# Patient Record
Sex: MALE | Race: WHITE | NOT HISPANIC OR LATINO | Employment: PART TIME | URBAN - METROPOLITAN AREA
[De-identification: names, ages, dates, MRNs, and addresses within clinical notes are randomized per-mention and may not be internally consistent; named-entity substitution may affect disease eponyms.]

---

## 2017-02-02 ENCOUNTER — TELEPHONE (OUTPATIENT)
Dept: FAMILY MEDICINE | Facility: CLINIC | Age: 62
End: 2017-02-02

## 2017-02-02 NOTE — Clinical Note
[facility]   [address]  [facility] [id number]      February 2, 2017      Sergio Davis  [address]  [place] [address]        Dearr Sergio,      This is a friendly reminder that you are due for a blood pressure recheck.  Your health is important to us.  We periodically review your chart to make sure that you are up-to-date on all health maintenance and that your chronic illnesses are adequately controlled.  It has come to our attention that your last blood pressure check was above the goal that your primary care provider has set for you.      Please call our office or request an appointment through [facility] at your earliest convenience.  You have multiple appointment options available to you, including no cost, low cost and convenient hours.  If you are interested in one of these options, call to speak with one of our nurses or medical assistants, to see what would be the most appropriate for you.      If you have any questions or concerns, please reach out to us through [facility] or by calling our clinic at [phone].          Sincerely,    [facility] [employer]     [facility]  Toñito Simpson MD

## 2017-02-02 NOTE — TELEPHONE ENCOUNTER
Panel Management Review      Patient has the following on his problem list:     Hypertension   Last three blood pressure readings:  BP Readings from Last 3 Encounters:   10/20/16 160/83   03/11/15 121/70   08/19/13 140/77     Blood pressure: Failed    HTN Guidelines:  Age 18-59 BP range:  Less than 140/90  Age 60-85 with Diabetes:  Less than 140/90  Age 60-85 without Diabetes:  less than 150/90      Composite cancer screening  Chart review shows that this patient is due/due soon for the following None  Summary:    Patient is due/failing the following:   BP CHECK    Action needed:   Patient needs nurse only appointment.    Type of outreach:    Sent letter.    Questions for provider review:    None                                                                                                                                    Jenny Gilliam CMA

## 2017-02-09 ENCOUNTER — TELEPHONE (OUTPATIENT)
Dept: FAMILY MEDICINE | Facility: CLINIC | Age: 62
End: 2017-02-09

## 2017-02-09 NOTE — Clinical Note
[address] [address]  [phone]  Dept: [phone]      February 9, 2017      Sergio Davis  [address]  [facility] [address]    Dear Sergio Davis,     At [facility] we care about your health and are committed to providing quality patient care.    Which includes staying current on preventive cancer screenings.  You can increase your chances of finding and treating cancers through regular screenings.      Our records indicate you may be due for the following preventive screening(s):    Colonoscopy    Colonoscopy is recommended every ten years for everyone age 50 and older. We strongly urge our patient's to consider having a colonoscopy done, which is the best screening test available and only needs to be done every 10 years if normal. If you are unwilling or unable to have a colonoscopy then we recommend the annual stool testing for blood. This test is called a FIT test and it looks for blood in the stool.     To schedule an appointment for your colonoscopy, please see the attached referral.     To schedule an appointment or discuss this screening further, you may contact us by phone at the [facility] at [phone] or online through the patient [email or web address] @ [email or web address]    If you have had any of the screenings listed above at another facility, please call us so that we may update your chart.      Your partners in health,      Quality Committee at [facility]

## 2017-11-24 DIAGNOSIS — I10 ESSENTIAL HYPERTENSION WITH GOAL BLOOD PRESSURE LESS THAN 130/80: ICD-10-CM

## 2017-11-24 RX ORDER — AMLODIPINE BESYLATE 5 MG/1
TABLET ORAL
Qty: 30 TABLET | Refills: 0 | Status: SHIPPED | OUTPATIENT
Start: 2017-11-24 | End: 2017-12-28

## 2017-11-24 NOTE — TELEPHONE ENCOUNTER
Routing refill request to provider for review/approval because:  Sammi given x1 and patient did not follow up, please advise  Chuyita Evans RN

## 2017-11-24 NOTE — TELEPHONE ENCOUNTER
Sammi refill #2. Patient did not have any future appointments set up.  If there is no clinic appointment within 30 days, there future Rx refill requests will be needed, regardless of circumstances.

## 2017-12-21 DIAGNOSIS — I10 ESSENTIAL HYPERTENSION WITH GOAL BLOOD PRESSURE LESS THAN 130/80: ICD-10-CM

## 2017-12-21 NOTE — TELEPHONE ENCOUNTER
Requested Prescriptions   Pending Prescriptions Disp Refills     amLODIPine (NORVASC) 5 MG tablet [Pharmacy Med Name: AMLODIPINE BESYLATE 5 MG TAB]  Last Written Prescription Date:  11/24/17  Last Fill Quantity: 30,  # refills: 0   Last Office Visit with [facility], P or [facility] prescribing provider:  10/20/16   Future Office Visit:    30 tablet 0     Sig: TAKE 1 TABLET BY MOUTH DAILY WITH DINNER. MUST HAVE APPT SCHEDULED FOR MORE REFILLS    Calcium Channel Blockers Protocol  Failed    12/21/2017  9:05 AM       Failed - Blood pressure under 140/90    BP Readings from Last 3 Encounters:   10/20/16 160/83   03/11/15 121/70   08/19/13 140/77                Failed - Recent or future visit with authorizing provider    Patient had office visit in the last year or has a visit in the next 30 days with authorizing provider.  See chart review.              Failed - Normal serum creatinine on file in past 12 months    Recent Labs   Lab Test  10/20/16   1029   CR  1.02            Passed - Patient is age 18 or older

## 2017-12-26 RX ORDER — AMLODIPINE BESYLATE 5 MG/1
TABLET ORAL
Qty: 30 TABLET | Refills: 0 | OUTPATIENT
Start: 2017-12-26

## 2017-12-26 NOTE — TELEPHONE ENCOUNTER
..Reason for Call:  Other     Detailed comments: Bekah called from [facility] in regards to pending script    Phone Number Patient can be reached at: [phone]    Best Time: anytime    Can we leave a detailed message on this number? Not Applicable    Call taken on 12/26/2017 at 2:30 PM by Aubrey Estrada

## 2017-12-27 NOTE — TELEPHONE ENCOUNTER
This writer attempted to contact patient on 12/27/17      Reason for call patient needs to schedule an appointment before medication is refilled and left message to return call.      If patient calls back:   Schedule Office Visit appointment as soon as possible with Dr. Simpson for a Blood pressure check and medication refills.        William Up

## 2017-12-27 NOTE — TELEPHONE ENCOUNTER
Call pt- Needs appointment for further refills.  Jeanette Worrell RN    amLODIPine (NORVASC) 5 MG tablet 30 tablet 0 11/24/2017  No   Sig: TAKE 1 TABLET BY MOUTH DAILY WITH DINNER   Class: E-Prescribe   Notes to Pharmacy: Last refill since no scheduled clinic appointments.   Order: [id number]   E-Prescribing Status: Receipt confirmed by pharmacy (11/24/2017  3:43 PM CST)

## 2017-12-28 RX ORDER — AMLODIPINE BESYLATE 5 MG/1
TABLET ORAL
Qty: 30 TABLET | Refills: 0 | Status: SHIPPED | OUTPATIENT
Start: 2017-12-28 | End: 2018-01-04

## 2017-12-28 NOTE — TELEPHONE ENCOUNTER
Patient is scheduled with Ondina Carrillo NP on 1//4/18, provider please address refill for patient.  William Up,  For [facility] [employer] [facility]

## 2018-01-04 ENCOUNTER — OFFICE VISIT (OUTPATIENT)
Dept: FAMILY MEDICINE | Facility: CLINIC | Age: 63
End: 2018-01-04
Payer: COMMERCIAL

## 2018-01-04 VITALS
DIASTOLIC BLOOD PRESSURE: 76 MMHG | WEIGHT: 278.4 LBS | HEIGHT: 77 IN | BODY MASS INDEX: 32.87 KG/M2 | HEART RATE: 67 BPM | TEMPERATURE: 97.3 F | SYSTOLIC BLOOD PRESSURE: 128 MMHG | OXYGEN SATURATION: 100 %

## 2018-01-04 DIAGNOSIS — Z00.00 ROUTINE HISTORY AND PHYSICAL EXAMINATION OF ADULT: Primary | ICD-10-CM

## 2018-01-04 DIAGNOSIS — Z12.11 SCREEN FOR COLON CANCER: ICD-10-CM

## 2018-01-04 DIAGNOSIS — Z13.6 CARDIOVASCULAR SCREENING; LDL GOAL LESS THAN 130: ICD-10-CM

## 2018-01-04 DIAGNOSIS — E66.09 CLASS 1 OBESITY DUE TO EXCESS CALORIES WITH SERIOUS COMORBIDITY AND BODY MASS INDEX (BMI) OF 32.0 TO 32.9 IN ADULT: ICD-10-CM

## 2018-01-04 DIAGNOSIS — E66.811 CLASS 1 OBESITY DUE TO EXCESS CALORIES WITH SERIOUS COMORBIDITY AND BODY MASS INDEX (BMI) OF 32.0 TO 32.9 IN ADULT: ICD-10-CM

## 2018-01-04 DIAGNOSIS — I10 ESSENTIAL HYPERTENSION WITH GOAL BLOOD PRESSURE LESS THAN 130/80: ICD-10-CM

## 2018-01-04 LAB
CREAT UR-MCNC: 196 MG/DL
MICROALBUMIN UR-MCNC: 9 MG/L
MICROALBUMIN/CREAT UR: 4.64 MG/G CR (ref 0–17)

## 2018-01-04 PROCEDURE — 99396 PREV VISIT EST AGE 40-64: CPT | Performed by: NURSE PRACTITIONER

## 2018-01-04 PROCEDURE — 82043 UR ALBUMIN QUANTITATIVE: CPT | Performed by: NURSE PRACTITIONER

## 2018-01-04 RX ORDER — AMLODIPINE BESYLATE 5 MG/1
TABLET ORAL
Qty: 90 TABLET | Refills: 3 | Status: SHIPPED | OUTPATIENT
Start: 2018-01-04 | End: 2019-02-26

## 2018-01-04 ASSESSMENT — PAIN SCALES - GENERAL: PAINLEVEL: NO PAIN (0)

## 2018-01-04 NOTE — PATIENT INSTRUCTIONS
At [facility], we strive to deliver an exceptional experience to you, every time we see you.  If you receive a survey in the mail, please send us back your thoughts. We really do value your feedback.    Based on your medical history, these are the current health maintenance/preventive care services that you are due for (some may have been done at this visit.)  Health Maintenance Due   Topic Date Due     COLONOSCOPY Q10 YR  11/19/1966     TETANUS IMMUNIZATION (SYSTEM ASSIGNED)  11/19/1972     HEPATITIS C SCREENING  11/19/1972     FIT Q1 YR  03/25/2014     INFLUENZA VACCINE (SYSTEM ASSIGNED)  09/01/2017         Suggested websites for health information:  Www.[place].org : Up to date and easily searchable information on multiple topics.  Www.medlineplus.gov : medication info, interactive tutorials, watch real surgeries online  Www.familydoctor.org : good info from the Academy of Family Physicians  Www.cdc.gov : public health info, travel advisories, epidemics (H1N1)  Www.aap.org : children's health info, normal development, vaccinations  Www.health.[facility].[place].us : [place] dept of health, public health issues in [place], N1N1    Your care team:                            Family Medicine Internal Medicine   MD Toñito Bangura MD Shantel Branch-Fleming, MD Katya Georgiev PA-C Nam Ho, MD Pediatrics   SORIN Merrill, ZENAIDA Dominguez APRN CNP   MD Liane Huffman MD Deborah Mielke, MD Kim Thein, APRN [employer]      Clinic hours: Monday - Thursday 7 am-7 pm; Fridays 7 am-5 pm.   Urgent care: Monday - Friday 11 am-9 pm; Saturday and Sunday 9 am-5 pm.  Pharmacy : Monday -Thursday 8 am-8 pm; Friday 8 am-6 pm; Saturday and Sunday 9 am-5 pm.     Clinic: [phone]   Pharmacy: [phone]    Preventive Health Recommendations  Male Ages 50   64    Yearly exam:             See your health care provider every year in order to  o   Review health changes.   o    Discuss preventive care.    o   Review your medicines if your doctor has prescribed any.     Have a cholesterol test every 5 years, or more frequently if you are at risk for high cholesterol/heart disease.     Have a diabetes test (fasting glucose) every three years. If you are at risk for diabetes, you should have this test more often.     Have a colonoscopy at age 50, or have a yearly FIT test (stool test). These exams will check for colon cancer.      Talk with your health care provider about whether or not a prostate cancer screening test (PSA) is right for you.    You should be tested each year for STDs (sexually transmitted diseases), if you re at risk.     Shots: Get a flu shot each year. Get a tetanus shot every 10 years.     Nutrition:    Eat at least 5 servings of fruits and vegetables daily.     Eat whole-grain bread, whole-wheat pasta and brown rice instead of white grains and rice.     Talk to your provider about Calcium and Vitamin D.     Lifestyle    Exercise for at least 150 minutes a week (30 minutes a day, 5 days a week). This will help you control your weight and prevent disease.     Limit alcohol to one drink per day.     No smoking.     Wear sunscreen to prevent skin cancer.     See your dentist every six months for an exam and cleaning.     See your eye doctor every 1 to 2 years.    Established High Blood Pressure    High blood pressure (hypertension) is a chronic disease. Often, healthcare providers don t know what causes it. But it can be caused by certain health conditions and medicines.  If you have high blood pressure, you may not have any symptoms. If you do have symptoms, they may include headache, dizziness, changes in your vision, chest pain, and shortness of breath. But even without symptoms, high blood pressure that s not treated raises your risk for heart attack and stroke. High blood pressure is a serious health risk and shouldn t be ignored.  A blood pressure reading is made up  of two numbers: a higher number over a lower number. The top number is the systolic pressure. The bottom number is the diastolic pressure. A normal blood pressure is a systolic pressure of  less than 120 over a diastolic pressure of less than 80. You will see your blood pressure readings written together. For example, a person with a systolic pressure of 188 and a diastolic pressure of 78 will have 118/78 written in the medical record.  High blood pressure is when either the top number is 140 or higher, or the bottom number is 90 or higher. This must be the result when taking your blood pressure a number of times. The blood pressures between normal and high are called prehypertension.  Home care  If you have high blood pressure, you should do what is listed below to lower your blood pressure. If you are taking medicines for high blood pressure, these methods may reduce or end your need for medicines in the future.    Begin a weight-loss program if you are overweight.    Cut back on how much salt you get in your diet. Here s how to do this:    Don t eat foods that have a lot of salt. These include olives, pickles, smoked meats, and salted potato chips.    Don t add salt to your food at the table.    Use only small amounts of salt when cooking.    Start an exercise program. Talk with your healthcare provider about the type of exercise program that would be best for you. It doesn't have to be hard. Even brisk walking for 20 minutes 3 times a week is a good form of exercise.    Don t take medicines that stimulate the heart. This includes many over-the-counter cold and sinus decongestant pills and sprays, as well as diet pills. Check the warnings about hypertension on the label. Before buying any over-the-counter medicines or supplements, always ask the pharmacist about the product's potential interaction with your high blood pressure and your high blood pressure medicines.    Stimulants such as amphetamine or cocaine  could be deadly for someone with high blood pressure. Never take these.    Limit how much caffeine you get in your diet. Switch to caffeine-free products.    Stop smoking. If you are a long-time smoker, this can be hard. Talk to your healthcare provider about medicines and nicotine replacement options to help you. Also, enroll in a stop-smoking program to make it more likely that you will quit for good.    Learn how to handle stress. This is an important part of any program to lower blood pressure. Learn about relaxation methods like meditation, yoga, or biofeedback.    If your provider prescribed medicines, take them exactly as directed. Missing doses may cause your blood pressure get out of control.    If you miss a dose or doses, check with your healthcare provider or pharmacist about what to do.    Consider buying an automatic blood pressure machine. Ask your provider for a recommendation. You can get one of these at most pharmacies.     The American Heart Association recommends the following guidelines for home blood pressure monitoring:    Don't smoke or drink coffee for 30 minutes before taking your blood pressure.    Go to the bathroom before the test.    Relax for 5 minutes before taking the measurement.    Sit with your back supported (don't sit on a couch or soft chair); keep your feet on the floor uncrossed. Place your arm on a solid flat surface (like a table) with the upper part of the arm at heart level. Place the middle of the cuff directly above the eye of the elbow. Check the monitor's instruction manual for an illustration.    Take multiple readings. When you measure, take 2 to 3 readings one minute apart and record all of the results.    Take your blood pressure at the same time every day, or as your healthcare provider recommends.    Record the date, time, and blood pressure reading.    Take the record with you to your next medical appointment. If your blood pressure monitor has a built-in  memory, simply take the monitor with you to your next appointment.    Call your provider if you have several high readings. Don't be frightened by a single high blood pressure reading, but if you get several high readings, check in with your healthcare provider.    Note: When blood pressure reaches a systolic (top number) of 180 or higher OR diastolic (bottom number) of 110 or higher, seek emergency medical treatment.  Follow-up care  You will need to see your healthcare provider regularly. This is to check your blood pressure and to make changes to your medicines. Make a follow-up appointment as directed. Bring the record of your home blood pressure readings to the appointment.  When to seek medical advice  Call your healthcare provider right away if any of these occur:    Blood pressure reaches a systolic (upper number) of 180 or higher OR a diastolic (bottom number) of 110 or higher    Chest pain or shortness of breath    Severe headache    Throbbing or rushing sound in the ears    Nosebleed    Sudden severe pain in your belly (abdomen)    Extreme drowsiness, confusion, or fainting    Dizziness or spinning sensation (vertigo)    Weakness of an arm or leg or one side of the face    You have problems speaking or seeing   Date Last Reviewed: 12/1/2016 2000-2017 [employer]. [address] [place] [address], [place] [address]. All rights reserved. This information is not intended as a substitute for professional medical care. Always follow your healthcare professional's instructions.

## 2018-01-04 NOTE — PROGRESS NOTES
"  SUBJECTIVE:   CC: Sergio Davis is an 62 year old male who presents for preventative health visit.     Healthy Habits:    Do you get at least three servings of calcium containing foods daily (dairy, green leafy vegetables, etc.)? Not sure    Amount of exercise or daily activities, outside of work: NO    Problems taking medications regularly No    Medication side effects: No    Have you had an eye exam in the past two years? yes    Do you see a dentist twice per year? yes    Do you have sleep apnea, excessive snoring or daytime drowsiness?yes         Hyperlipidemia Follow-Up      Rate your low fat/cholesterol diet?: good    Taking statin?  No    Other lipid medications/supplements?:  none    Hypertension Follow-up      Outpatient blood pressures are being checked at home.  Results are 130-140's/76-80.    Low Salt Diet: no added salt            Today's PHQ-2 Score:   PHQ-2 ( 1999 Pfizer) 1/4/2018 10/20/2016   Q1: Little interest or pleasure in doing things 0 0   Q2: Feeling down, depressed or hopeless 0 0   PHQ-2 Score 0 0         Abuse: Current or Past(Physical, Sexual or Emotional)- No  Do you feel safe in your environment - Yes  Social History   Substance Use Topics     Smoking status: Never Smoker     Smokeless tobacco: Never Used      Comment: no second hand smoke     Alcohol use Yes      Comment: \"very little\"      If you drink alcohol do you typically have >3 drinks per day or >7 drinks per week? No                      Last PSA: No results found for: PSA    Reviewed orders with patient. Reviewed health maintenance and updated orders accordingly - Yes  Labs reviewed in EPIC  BP Readings from Last 3 Encounters:   01/04/18 128/76   10/20/16 160/83   03/11/15 121/70    Wt Readings from Last 3 Encounters:   01/04/18 278 lb 6.4 oz (126.3 kg)   10/20/16 282 lb (127.9 kg)   08/19/13 253 lb 3.2 oz (114.9 kg)                  Patient Active Problem List   Diagnosis     S/P complete repair of rotator cuff     " "Cardiomegaly     CARDIOVASCULAR SCREENING; LDL GOAL LESS THAN 130     Essential hypertension with goal blood pressure less than 130/80     Obesity     Cataract of left eye     Past Surgical History:   Procedure Laterality Date     ARTHROSCOPY SHOULDER ROTATOR CUFF REPAIR      10/25/10       Social History   Substance Use Topics     Smoking status: Never Smoker     Smokeless tobacco: Never Used      Comment: no second hand smoke     Alcohol use Yes      Comment: \"very little\"     Family History   Problem Relation Age of Onset     CANCER Sister      Hodgjostin's Lymphoma     DIABETES No family hx of      Hypertension No family hx of      CEREBROVASCULAR DISEASE No family hx of      C.A.D. No family hx of      Myocardial Infarction No family hx of      Heart Failure No family hx of          Current Outpatient Prescriptions   Medication Sig Dispense Refill     amLODIPine (NORVASC) 5 MG tablet TAKE 1 TABLET BY MOUTH DAILY WITH DINNER 30 tablet 0     ORDER FOR DME Respironics REMSTAR 60 Series Auto CPAP 6cm H2O, Mirage Fx wide nasal mask w/chinstrap             Reviewed and updated as needed this visit by clinical staffTobacco  Allergies  Meds  Med Hx  Surg Hx  Fam Hx  Soc Hx        Reviewed and updated as needed this visit by Provider        Past Medical History:   Diagnosis Date     Hyperlipidemia LDL goal < 160      NO ACTIVE PROBLEMS      Rotator cuff tear       Past Surgical History:   Procedure Laterality Date     ARTHROSCOPY SHOULDER ROTATOR CUFF REPAIR      10/25/10       ROS:  C: NEGATIVE for fever, chills, change in weight  I: NEGATIVE for worrisome rashes, moles or lesions  E: NEGATIVE for vision changes or irritation  ENT: NEGATIVE for ear, mouth and throat problems  R: NEGATIVE for significant cough or SOB  B: NEGATIVE for masses, tenderness or discharge  CV: NEGATIVE for chest pain, palpitations or peripheral edema  GI: NEGATIVE for nausea, abdominal pain, heartburn, or change in bowel habits   male: " "negative for dysuria, hematuria, decreased urinary stream, erectile dysfunction, urethral discharge  M: NEGATIVE for significant arthralgias or myalgia  N: NEGATIVE for weakness, dizziness or paresthesias  E: NEGATIVE for temperature intolerance, skin/hair changes  H: NEGATIVE for bleeding problems  P: NEGATIVE for changes in mood or affect    OBJECTIVE:   /76 (BP Location: Left arm, Patient Position: Chair, Cuff Size: Adult Regular)  Pulse 67  Temp 97.3  F (36.3  C) (Oral)  Ht 6' 5.17\" (1.96 m)  Wt 278 lb 6.4 oz (126.3 kg)  SpO2 100%  BMI 32.87 kg/m2  EXAM:  GENERAL: healthy, alert and no distress  EYES: Eyes grossly normal to inspection, PERRL and conjunctivae and sclerae normal  HENT: ear canals and TM's normal, nose and mouth without ulcers or lesions  NECK: no adenopathy, no asymmetry, masses, or scars and thyroid normal to palpation  RESP: lungs clear to auscultation - no rales, rhonchi or wheezes  CV: regular rate and rhythm, normal S1 S2, no S3 or S4, no murmur, click or rub, no peripheral edema and peripheral pulses strong  ABDOMEN: soft, nontender, no hepatosplenomegaly, no masses and bowel sounds normal   (male): normal male genitalia without lesions or urethral discharge, no hernia  RECTAL: normal sphincter tone, no rectal masses, prostate normal size, smooth, nontender without nodules or masses  MS: no gross musculoskeletal defects noted, no edema  SKIN: no suspicious lesions or rashes  NEURO: Normal strength and tone, mentation intact and speech normal  PSYCH: mentation appears normal, affect normal/bright  LYMPH: no cervical, supraclavicular, axillary, or inguinal adenopathy    ASSESSMENT/PLAN:   1. Routine history and physical examination of adult      2. Essential hypertension with goal blood pressure less than 130/80  Checking labs, refilled Norvasc, encouraged continued heart healthy diet, regular exercise, weight loss.  - Albumin Random Urine Quantitative with Creat Ratio  - " "Comprehensive metabolic panel (BMP + Alb, Alk Phos, ALT, AST, Total. Bili, TP); Future  - amLODIPine (NORVASC) 5 MG tablet; TAKE 1 TABLET BY MOUTH DAILY WITH DINNER  Dispense: 90 tablet; Refill: 3    3. CARDIOVASCULAR SCREENING; LDL GOAL LESS THAN 130  Last LDL was 140/goal is < 130.  Rechecking Lipids when he is fasting.  Advised low chol deti, weight loss, regular exercise to improve his CV fitness/decrease CV risks.  - Lipid panel reflex to direct LDL Fasting; Future    4. Screen for colon cancer    - Fecal colorectal cancer screen (FIT); Future  5.  Class 1 obesity due to excess calories with serious comorbidity and body mass index (BMI) of 32.0-32.9.  Benefits of weight loss reviewed in detail, encouraged him to cut back on the carbohydrates in the diet, consume more fruits and vegetables, drink plenty of water, avoid fruit juices, sodas, get 150 min moderate exercise/week.  Recheck weight in 6 months.      Patient reports that he is current on both tetanus vaccine and Influenza vaccine (done outside our system), tetanus done within the last 4 years per patient, Influenza done 10/2017.    COUNSELING:  Reviewed preventive health counseling, as reflected in patient instructions       Regular exercise       Healthy diet/nutrition       Vision screening       Safe sex practices/STD prevention       Consider Hep C screening for patients born between 1945 and 1965       Colon cancer screening       Prostate cancer screening  Patient declined Hep C screen as well as PSA screen.     reports that he has never smoked. He has never used smokeless tobacco.      Estimated body mass index is 32.87 kg/(m^2) as calculated from the following:    Height as of this encounter: 6' 5.17\" (1.96 m).    Weight as of this encounter: 278 lb 6.4 oz (126.3 kg).   Weight management plan: Discussed healthy diet and exercise guidelines and patient will follow up in 12 months in clinic to re-evaluate.    Counseling Resources:  ATP IV " Guidelines  Pooled Cohorts Equation Calculator  FRAX Risk Assessment  ICSI Preventive Guidelines  Dietary Guidelines for Americans, 2010  USDA's MyPlate  ASA Prophylaxis  Lung CA Screening    RADHA Meza CNP  [facility]

## 2018-01-04 NOTE — MR AVS SNAPSHOT
After Visit Summary   1/4/2018    Sergio Davis    MRN: [id number]           Patient Information     Date Of Birth          1955        Visit Information        Provider Department      1/4/2018 8:00 AM Muna Carrillo APRN CNP [facility]        Today's Diagnoses     Routine history and physical examination of adult    -  1    Essential hypertension with goal blood pressure less than 130/80        CARDIOVASCULAR SCREENING; LDL GOAL LESS THAN 130        Screen for colon cancer        Need for prophylactic vaccination and inoculation against influenza        Need for prophylactic vaccination with tetanus-diphtheria (TD)          Care Instructions    At [facility], we strive to deliver an exceptional experience to you, every time we see you.  If you receive a survey in the mail, please send us back your thoughts. We really do value your feedback.    Based on your medical history, these are the current health maintenance/preventive care services that you are due for (some may have been done at this visit.)  Health Maintenance Due   Topic Date Due     COLONOSCOPY Q10 YR  11/19/1966     TETANUS IMMUNIZATION (SYSTEM ASSIGNED)  11/19/1972     HEPATITIS C SCREENING  11/19/1972     FIT Q1 YR  03/25/2014     INFLUENZA VACCINE (SYSTEM ASSIGNED)  09/01/2017         Suggested websites for health information:  Www.canvs.co.org : Up to date and easily searchable information on multiple topics.  Www.medlineplus.gov : medication info, interactive tutorials, watch real surgeries online  Www.familydoctor.org : good info from the Academy of Family Physicians  Www.cdc.gov : public health info, travel advisories, epidemics (H1N1)  Www.aap.org : children's health info, normal development, vaccinations  Www.health.On license of [facility].[place].us : [place] dept of health, public health issues in [place], N1N1    Your care team:                            Family Medicine Internal Medicine   Brando  MD Toñito Pelletier MD Shantel Branch-Fleming, MD Katya Georgiev PA-C Nam Ho, MD Pediatrics   SORIN Merrill, MD Liane Aldana CNP, MD Deborah Mielke, MD Kim Thein, APRN [facility]      Clinic hours: Monday - Thursday 7 am-7 pm; Fridays 7 am-5 pm.   Urgent care: Monday - Friday 11 am-9 pm; Saturday and Sunday 9 am-5 pm.  Pharmacy : Monday -Thursday 8 am-8 pm; Friday 8 am-6 pm; Saturday and Sunday 9 am-5 pm.     Clinic: [phone]   Pharmacy: [phone]    Preventive Health Recommendations  Male Ages 50 - 64    Yearly exam:             See your health care provider every year in order to  o   Review health changes.   o   Discuss preventive care.    o   Review your medicines if your doctor has prescribed any.     Have a cholesterol test every 5 years, or more frequently if you are at risk for high cholesterol/heart disease.     Have a diabetes test (fasting glucose) every three years. If you are at risk for diabetes, you should have this test more often.     Have a colonoscopy at age 50, or have a yearly FIT test (stool test). These exams will check for colon cancer.      Talk with your health care provider about whether or not a prostate cancer screening test (PSA) is right for you.    You should be tested each year for STDs (sexually transmitted diseases), if you re at risk.     Shots: Get a flu shot each year. Get a tetanus shot every 10 years.     Nutrition:    Eat at least 5 servings of fruits and vegetables daily.     Eat whole-grain bread, whole-wheat pasta and brown rice instead of white grains and rice.     Talk to your provider about Calcium and Vitamin D.     Lifestyle    Exercise for at least 150 minutes a week (30 minutes a day, 5 days a week). This will help you control your weight and prevent disease.     Limit alcohol to one drink per day.     No smoking.     Wear sunscreen to prevent skin cancer.     See your dentist  every six months for an exam and cleaning.     See your eye doctor every 1 to 2 years.    Established High Blood Pressure    High blood pressure (hypertension) is a chronic disease. Often, healthcare providers don t know what causes it. But it can be caused by certain health conditions and medicines.  If you have high blood pressure, you may not have any symptoms. If you do have symptoms, they may include headache, dizziness, changes in your vision, chest pain, and shortness of breath. But even without symptoms, high blood pressure that s not treated raises your risk for heart attack and stroke. High blood pressure is a serious health risk and shouldn t be ignored.  A blood pressure reading is made up of two numbers: a higher number over a lower number. The top number is the systolic pressure. The bottom number is the diastolic pressure. A normal blood pressure is a systolic pressure of  less than 120 over a diastolic pressure of less than 80. You will see your blood pressure readings written together. For example, a person with a systolic pressure of 188 and a diastolic pressure of 78 will have 118/78 written in the medical record.  High blood pressure is when either the top number is 140 or higher, or the bottom number is 90 or higher. This must be the result when taking your blood pressure a number of times. The blood pressures between normal and high are called prehypertension.  Home care  If you have high blood pressure, you should do what is listed below to lower your blood pressure. If you are taking medicines for high blood pressure, these methods may reduce or end your need for medicines in the future.    Begin a weight-loss program if you are overweight.    Cut back on how much salt you get in your diet. Here s how to do this:    Don t eat foods that have a lot of salt. These include olives, pickles, smoked meats, and salted potato chips.    Don t add salt to your food at the table.    Use only small  amounts of salt when cooking.    Start an exercise program. Talk with your healthcare provider about the type of exercise program that would be best for you. It doesn't have to be hard. Even brisk walking for 20 minutes 3 times a week is a good form of exercise.    Don t take medicines that stimulate the heart. This includes many over-the-counter cold and sinus decongestant pills and sprays, as well as diet pills. Check the warnings about hypertension on the label. Before buying any over-the-counter medicines or supplements, always ask the pharmacist about the product's potential interaction with your high blood pressure and your high blood pressure medicines.    Stimulants such as amphetamine or cocaine could be deadly for someone with high blood pressure. Never take these.    Limit how much caffeine you get in your diet. Switch to caffeine-free products.    Stop smoking. If you are a long-time smoker, this can be hard. Talk to your healthcare provider about medicines and nicotine replacement options to help you. Also, enroll in a stop-smoking program to make it more likely that you will quit for good.    Learn how to handle stress. This is an important part of any program to lower blood pressure. Learn about relaxation methods like meditation, yoga, or biofeedback.    If your provider prescribed medicines, take them exactly as directed. Missing doses may cause your blood pressure get out of control.    If you miss a dose or doses, check with your healthcare provider or pharmacist about what to do.    Consider buying an automatic blood pressure machine. Ask your provider for a recommendation. You can get one of these at most pharmacies.     The American Heart Association recommends the following guidelines for home blood pressure monitoring:    Don't smoke or drink coffee for 30 minutes before taking your blood pressure.    Go to the bathroom before the test.    Relax for 5 minutes before taking the  measurement.    Sit with your back supported (don't sit on a couch or soft chair); keep your feet on the floor uncrossed. Place your arm on a solid flat surface (like a table) with the upper part of the arm at heart level. Place the middle of the cuff directly above the eye of the elbow. Check the monitor's instruction manual for an illustration.    Take multiple readings. When you measure, take 2 to 3 readings one minute apart and record all of the results.    Take your blood pressure at the same time every day, or as your healthcare provider recommends.    Record the date, time, and blood pressure reading.    Take the record with you to your next medical appointment. If your blood pressure monitor has a built-in memory, simply take the monitor with you to your next appointment.    Call your provider if you have several high readings. Don't be frightened by a single high blood pressure reading, but if you get several high readings, check in with your healthcare provider.    Note: When blood pressure reaches a systolic (top number) of 180 or higher OR diastolic (bottom number) of 110 or higher, seek emergency medical treatment.  Follow-up care  You will need to see your healthcare provider regularly. This is to check your blood pressure and to make changes to your medicines. Make a follow-up appointment as directed. Bring the record of your home blood pressure readings to the appointment.  When to seek medical advice  Call your healthcare provider right away if any of these occur:    Blood pressure reaches a systolic (upper number) of 180 or higher OR a diastolic (bottom number) of 110 or higher    Chest pain or shortness of breath    Severe headache    Throbbing or rushing sound in the ears    Nosebleed    Sudden severe pain in your belly (abdomen)    Extreme drowsiness, confusion, or fainting    Dizziness or spinning sensation (vertigo)    Weakness of an arm or leg or one side of the face    You have problems  "speaking or seeing   Date Last Reviewed: 12/1/2016 2000-2017 [employer]. [address] [place] [address], [place] [address]. All rights reserved. This information is not intended as a substitute for professional medical care. Always follow your healthcare professional's instructions.                Follow-ups after your visit        Future tests that were ordered for you today     Open Future Orders        Priority Expected Expires Ordered    Comprehensive metabolic panel (BMP + Alb, Alk Phos, ALT, AST, Total. Bili, TP) Routine  3/4/2018 1/4/2018    Fecal colorectal cancer screen (FIT) Routine 1/25/2018 3/29/2018 1/4/2018    Lipid panel reflex to direct LDL Fasting Routine 1/4/2018 3/4/2018 1/4/2018            Who to contact     If you have questions or need follow up information about today's clinic visit or your schedule please contact [facility] directly at [phone].  Normal or non-critical lab and imaging results will be communicated to you by [employer], letter or phone within 4 business days after the clinic has received the results. If you do not hear from us within 7 days, please contact the clinic through [employer] or phone. If you have a critical or abnormal lab result, we will notify you by phone as soon as possible.  Submit refill requests through [email or web address] or call your pharmacy and they will forward the refill request to us. Please allow 3 business days for your refill to be completed.          Additional Information About Your Visit        [employer] Information     [email or web address] lets you send messages to your doctor, view your test results, renew your prescriptions, schedule appointments and more. To sign up, go to [email or web address] . Click on \"Log in\" on the left side of the screen, which will take you to the Welcome page. Then click on \"Sign up Now\" on the right side of the page.     You will be asked to enter the access code listed below, as well as some personal " "information. Please follow the directions to create your username and password.     Your access code is: [email or web address]  Expires: 2018  8:53 AM     Your access code will  in 90 days. If you need help or a new code, please call your [facility] clinic or [phone].        Care EveryWhere ID     This is your Care EveryWhere ID. This could be used by other organizations to access your [facility] medical records  [id number]        Your Vitals Were     Pulse Temperature Height Pulse Oximetry BMI (Body Mass Index)       67 97.3  F (36.3  C) (Oral) 6' 5.17\" (1.96 m) 100% 32.87 kg/m2        Blood Pressure from Last 3 Encounters:   18 128/76   10/20/16 160/83   03/11/15 121/70    Weight from Last 3 Encounters:   18 278 lb 6.4 oz (126.3 kg)   10/20/16 282 lb (127.9 kg)   13 253 lb 3.2 oz (114.9 kg)              We Performed the Following     Albumin Random Urine Quantitative with Creat Ratio          Where to get your medicines      These medications were sent to [facility] [address] [facility] [place] - [address]  [address] [place] [facility] [address]     Phone:  [phone]     amLODIPine 5 MG tablet          Primary Care Provider Office Phone # Fax #    Toñito Simpson [phone]       [address]  [place] [facility] [address]        Equal Access to Services     [facility]: Hadii aad ku hadasho Soomaali, waaxda luqadaha, qaybta kaalmada adeegyada, michelle francis . So [facility] [phone].    ATENCIÓN: Si habla español, tiene a price disposición servicios gratuitos de asistencia lingüística. Llame al [phone].    We comply with applicable federal civil rights laws and [place] laws. We do not discriminate on the basis of race, color, national origin, age, disability, sex, sexual orientation, or gender identity.            Thank you!     Thank you for choosing [facility]  for your care. Our goal is always " to provide you with excellent care. Hearing back from our patients is one way we can continue to improve our services. Please take a few minutes to complete the written survey that you may receive in the mail after your visit with us. Thank you!             Your Updated Medication List - Protect others around you: Learn how to safely use, store and throw away your medicines at www.disposemymeds.org.          This list is accurate as of: 1/4/18  8:53 AM.  Always use your most recent med list.                   Brand Name Dispense Instructions for use Diagnosis    amLODIPine 5 MG tablet    NORVASC    90 tablet    TAKE 1 TABLET BY MOUTH DAILY WITH DINNER    Essential hypertension with goal blood pressure less than 130/80       order for DME      Respironics REMSTAR 60 Series Auto CPAP 6cm H2O, Mirage Fx wide nasal mask w/chinstrap

## 2018-01-10 PROCEDURE — 82274 ASSAY TEST FOR BLOOD FECAL: CPT | Performed by: NURSE PRACTITIONER

## 2018-01-11 ENCOUNTER — APPOINTMENT (OUTPATIENT)
Dept: SLEEP MEDICINE | Facility: CLINIC | Age: 63
End: 2018-01-11
Payer: COMMERCIAL

## 2018-01-11 DIAGNOSIS — Z12.11 SCREEN FOR COLON CANCER: ICD-10-CM

## 2018-01-11 DIAGNOSIS — I10 ESSENTIAL HYPERTENSION WITH GOAL BLOOD PRESSURE LESS THAN 130/80: ICD-10-CM

## 2018-01-11 DIAGNOSIS — Z13.6 CARDIOVASCULAR SCREENING; LDL GOAL LESS THAN 130: ICD-10-CM

## 2018-01-11 LAB
ALBUMIN SERPL-MCNC: 4.1 G/DL (ref 3.4–5)
ALP SERPL-CCNC: 55 U/L (ref 40–150)
ALT SERPL W P-5'-P-CCNC: 12 U/L (ref 0–70)
ANION GAP SERPL CALCULATED.3IONS-SCNC: 6 MMOL/L (ref 3–14)
AST SERPL W P-5'-P-CCNC: 17 U/L (ref 0–45)
BILIRUB SERPL-MCNC: 0.4 MG/DL (ref 0.2–1.3)
BUN SERPL-MCNC: 15 MG/DL (ref 7–30)
CALCIUM SERPL-MCNC: 8.9 MG/DL (ref 8.5–10.1)
CHLORIDE SERPL-SCNC: 106 MMOL/L (ref 94–109)
CHOLEST SERPL-MCNC: 209 MG/DL
CO2 SERPL-SCNC: 28 MMOL/L (ref 20–32)
CREAT SERPL-MCNC: 1.03 MG/DL (ref 0.66–1.25)
GFR SERPL CREATININE-BSD FRML MDRD: 73 ML/MIN/1.7M2
GLUCOSE SERPL-MCNC: 95 MG/DL (ref 70–99)
HDLC SERPL-MCNC: 50 MG/DL
HEMOCCULT STL QL IA: NEGATIVE
LDLC SERPL CALC-MCNC: 144 MG/DL
NONHDLC SERPL-MCNC: 159 MG/DL
POTASSIUM SERPL-SCNC: 4.2 MMOL/L (ref 3.4–5.3)
PROT SERPL-MCNC: 7.5 G/DL (ref 6.8–8.8)
SODIUM SERPL-SCNC: 140 MMOL/L (ref 133–144)
TRIGL SERPL-MCNC: 77 MG/DL

## 2018-01-11 PROCEDURE — 80053 COMPREHEN METABOLIC PANEL: CPT | Performed by: NURSE PRACTITIONER

## 2018-01-11 PROCEDURE — 36415 COLL VENOUS BLD VENIPUNCTURE: CPT | Performed by: NURSE PRACTITIONER

## 2018-01-11 PROCEDURE — 80061 LIPID PANEL: CPT | Performed by: NURSE PRACTITIONER

## 2019-02-25 DIAGNOSIS — I10 ESSENTIAL HYPERTENSION WITH GOAL BLOOD PRESSURE LESS THAN 130/80: ICD-10-CM

## 2019-02-25 NOTE — TELEPHONE ENCOUNTER
"Requested Prescriptions   Pending Prescriptions Disp Refills     amLODIPine (NORVASC) 5 MG tablet    Last Written Prescription Date:  01/04/18  Last Fill Quantity: 90,  # refills: 3   Last Office Visit with [facility], [facility] or [facility] prescribing provider:  01/04/18-Thein   Future Office Visit:    90 tablet 3     Sig: TAKE 1 TABLET BY MOUTH DAILY WITH DINNER    Calcium Channel Blockers Protocol  Failed - 2/25/2019  7:23 AM       Failed - Blood pressure under 140/90 in past 12 months    BP Readings from Last 3 Encounters:   01/04/18 128/76   10/20/16 160/83   03/11/15 121/70                Failed - Recent (12 mo) or future (30 days) visit within the authorizing provider's specialty    Patient had office visit in the last 12 months or has a visit in the next 30 days with authorizing provider or within the authorizing provider's specialty.  See \"Patient Info\" tab in inbasket, or \"Choose Columns\" in Meds & Orders section of the refill encounter.             Failed - Normal serum creatinine on file in past 12 months    Recent Labs   Lab Test 01/11/18  0843   CR 1.03            Passed - Medication is active on med list       Passed - Patient is age 18 or older          "

## 2019-02-26 RX ORDER — AMLODIPINE BESYLATE 5 MG/1
TABLET ORAL
Qty: 30 TABLET | Refills: 0 | Status: SHIPPED | OUTPATIENT
Start: 2019-02-26 | End: 2019-03-30

## 2019-02-27 NOTE — TELEPHONE ENCOUNTER
Called and spoke to patient and offered to schedule the appointment. Patient  Will call back to schedule a physical.  Amanda Byrd MA/  For Teams Kylah

## 2019-02-27 NOTE — TELEPHONE ENCOUNTER
Please schedule patient. Sammi refill given.   Needs appointment for further refills. No future appointments found. (due for physical and labs)  Jocelyn Rivera RN

## 2019-05-28 ENCOUNTER — TELEPHONE (OUTPATIENT)
Dept: FAMILY MEDICINE | Facility: CLINIC | Age: 64
End: 2019-05-28

## 2019-05-28 DIAGNOSIS — I10 ESSENTIAL HYPERTENSION WITH GOAL BLOOD PRESSURE LESS THAN 130/80: ICD-10-CM

## 2019-05-28 NOTE — TELEPHONE ENCOUNTER
"Requested Prescriptions   Pending Prescriptions Disp Refills     amLODIPine (NORVASC) 5 MG tablet [Pharmacy Med Name: AMLODIPINE BESYLATE 5 MG TAB]  Last Written Prescription Date:  04/01/19  Last Fill Quantity: 30,  # refills: 1   Last Office Visit with G, P or [facility] prescribing provider:  01/04/18-Thein   Future Office Visit:    30 tablet 1     Sig: TAKE 1 TABLET BY MOUTH EVERY DAY WITH DINNER       Calcium Channel Blockers Protocol  Failed - 5/28/2019  1:37 AM        Failed - Blood pressure under 140/90 in past 12 months     BP Readings from Last 3 Encounters:   01/04/18 128/76   10/20/16 160/83   03/11/15 121/70                 Failed - Recent (12 mo) or future (30 days) visit within the authorizing provider's specialty     Patient had office visit in the last 12 months or has a visit in the next 30 days with authorizing provider or within the authorizing provider's specialty.  See \"Patient Info\" tab in inbasket, or \"Choose Columns\" in Meds & Orders section of the refill encounter.              Failed - Normal serum creatinine on file in past 12 months     Recent Labs   Lab Test 01/11/18  0843   CR 1.03             Passed - Medication is active on med list        Passed - Patient is age 18 or older          "

## 2019-05-29 RX ORDER — AMLODIPINE BESYLATE 5 MG/1
TABLET ORAL
Qty: 30 TABLET | Refills: 1 | OUTPATIENT
Start: 2019-05-29

## 2019-05-29 NOTE — TELEPHONE ENCOUNTER
Routing refill request to provider for review/approval because:  Sammi given x1 and patient did not follow up, please advise    Mariel Seymour RN, [facility]

## 2019-05-30 RX ORDER — AMLODIPINE BESYLATE 5 MG/1
TABLET ORAL
Qty: 30 TABLET | Refills: 1 | Status: SHIPPED | OUTPATIENT
Start: 2019-05-30 | End: 2019-06-05

## 2019-06-05 ENCOUNTER — OFFICE VISIT (OUTPATIENT)
Dept: FAMILY MEDICINE | Facility: CLINIC | Age: 64
End: 2019-06-05
Payer: COMMERCIAL

## 2019-06-05 VITALS
HEART RATE: 67 BPM | HEIGHT: 76 IN | DIASTOLIC BLOOD PRESSURE: 74 MMHG | WEIGHT: 275 LBS | TEMPERATURE: 98.2 F | OXYGEN SATURATION: 97 % | BODY MASS INDEX: 33.49 KG/M2 | SYSTOLIC BLOOD PRESSURE: 138 MMHG

## 2019-06-05 DIAGNOSIS — G47.30 SLEEP APNEA, UNSPECIFIED TYPE: ICD-10-CM

## 2019-06-05 DIAGNOSIS — I10 ESSENTIAL HYPERTENSION WITH GOAL BLOOD PRESSURE LESS THAN 130/80: Primary | ICD-10-CM

## 2019-06-05 DIAGNOSIS — Z12.11 SPECIAL SCREENING FOR MALIGNANT NEOPLASMS, COLON: ICD-10-CM

## 2019-06-05 PROCEDURE — 99214 OFFICE O/P EST MOD 30 MIN: CPT | Performed by: PREVENTIVE MEDICINE

## 2019-06-05 PROCEDURE — 83721 ASSAY OF BLOOD LIPOPROTEIN: CPT | Performed by: PREVENTIVE MEDICINE

## 2019-06-05 PROCEDURE — 36415 COLL VENOUS BLD VENIPUNCTURE: CPT | Performed by: PREVENTIVE MEDICINE

## 2019-06-05 PROCEDURE — 82043 UR ALBUMIN QUANTITATIVE: CPT | Performed by: PREVENTIVE MEDICINE

## 2019-06-05 PROCEDURE — 80048 BASIC METABOLIC PNL TOTAL CA: CPT | Performed by: PREVENTIVE MEDICINE

## 2019-06-05 RX ORDER — AMLODIPINE BESYLATE 5 MG/1
TABLET ORAL
Qty: 90 TABLET | Refills: 3 | Status: SHIPPED | OUTPATIENT
Start: 2019-06-05 | End: 2020-07-21

## 2019-06-05 ASSESSMENT — PAIN SCALES - GENERAL: PAINLEVEL: NO PAIN (0)

## 2019-06-05 ASSESSMENT — MIFFLIN-ST. JEOR: SCORE: 2138.89

## 2019-06-05 NOTE — PROGRESS NOTES
"Subjective     Sergio Davis is a 63 year old male who presents to clinic today for the following health issues:    HPI     Hypertension Follow-up      Do you check your blood pressure regularly outside of the clinic? Yes     Are you following a low salt diet? No    Are your blood pressures ever more than 140 on the top number (systolic) OR more   than 90 on the bottom number (diastolic), for example 140/90? Yes    Amount of exercise or physical activity: None    Problems taking medications regularly: No    Medication side effects: none    Diet: regular (no restrictions)      Sleep apnea:  -consistent use of CPAP  -uses at least 6 hours every night     Patient Active Problem List   Diagnosis     S/P complete repair of rotator cuff     Cardiomegaly     CARDIOVASCULAR SCREENING; LDL GOAL LESS THAN 130     Essential hypertension with goal blood pressure less than 130/80     Obesity     Cataract of left eye     Past Surgical History:   Procedure Laterality Date     ARTHROSCOPY SHOULDER ROTATOR CUFF REPAIR      10/25/10       Social History     Tobacco Use     Smoking status: Never Smoker     Smokeless tobacco: Never Used     Tobacco comment: no second hand smoke   Substance Use Topics     Alcohol use: Yes     Comment: \"very little\"     Family History   Problem Relation Age of Onset     Cancer Sister         Hodgekin's Lymphoma     Diabetes No family hx of      Hypertension No family hx of      Cerebrovascular Disease No family hx of      C.A.D. No family hx of      Myocardial Infarction No family hx of      Heart Failure No family hx of          Current Outpatient Medications   Medication Sig Dispense Refill     amLODIPine (NORVASC) 5 MG tablet TAKE 1 TABLET BY MOUTH EVERY DAY WITH DINNER 90 tablet 3     ORDER FOR [facility] Respironics REMSTAR 60 Series Auto CPAP 6cm H2O, Mirage Fx wide nasal mask w/chinstrap       No Known Allergies  BP Readings from Last 3 Encounters:   06/05/19 138/74   01/04/18 128/76   10/20/16 160/83 " "   Wt Readings from Last 3 Encounters:   06/05/19 124.7 kg (275 lb)   01/04/18 126.3 kg (278 lb 6.4 oz)   10/20/16 127.9 kg (282 lb)           Reviewed and updated as needed this visit by Provider  Tobacco  Allergies  Meds  Problems  Med Hx  Surg Hx  Fam Hx         Review of Systems   ROS COMP: Constitutional, HEENT, cardiovascular, pulmonary, gi and gu systems are negative, except as otherwise noted.      Objective    /74   Pulse 67   Temp 98.2  F (36.8  C) (Oral)   Ht 1.93 m (6' 4\")   Wt 124.7 kg (275 lb)   SpO2 97%   BMI 33.47 kg/m    Body mass index is 33.47 kg/m .  Physical Exam   GENERAL APPEARANCE: healthy, alert and no distress  EYES: Eyes grossly normal to inspection and conjunctivae and sclerae normal  NECK: no adenopathy and trachea midline and normal to palpation  RESP: lungs clear to auscultation - no rales, rhonchi or wheezes  CV: regular rates and rhythm, normal S1 S2, no S3 or S4 and no murmur, click or rub  ABDOMEN: soft, non-tender and no rebound or guarding   MS: Trace pedal edema noted bilaterally+ no gross deformities noted and peripheral pulses normal  SKIN: no suspicious lesions or rashes  NEURO: Normal strength and tone, mentation intact and speech normal  PSYCH: mentation appears normal      Diagnostic Test Results:  Labs reviewed in Epic  No results found for this or any previous visit (from the past 24 hour(s)).        Assessment & Plan     Sergio was seen today for hypertension.    Diagnoses and all orders for this visit:    Essential hypertension with goal blood pressure less than 130/80  -     Albumin Random Urine Quantitative with Creat Ratio  -     Basic metabolic panel  -     LDL cholesterol direct  -     amLODIPine (NORVASC) 5 MG tablet; TAKE 1 TABLET BY MOUTH EVERY DAY WITH DINNER  -at goal  -refills on medication provided     Special screening for malignant neoplasms, colon  -declined     Sleep apnea, unspecified type  -continue with use of CPAP        BMI: " "  Estimated body mass index is 33.47 kg/m  as calculated from the following:    Height as of this encounter: 1.93 m (6' 4\").    Weight as of this encounter: 124.7 kg (275 lb).   Weight management plan: Discussed healthy diet and exercise guidelines        See Patient Instructions    Return in about 1 year (around 6/5/2020) for Routine Visit, Lab Work, Physical Exam, BP Recheck.    Linda Ho MD MPH    [facility]      "

## 2019-06-05 NOTE — PATIENT INSTRUCTIONS
At [facility], we strive to deliver an exceptional experience to you, every time we see you.  If you receive a survey in the mail, please send us back your thoughts. We really do value your feedback.    Your care team:                            Family Medicine Internal Medicine   MD Toñito Bangura MD Shantel Branch-Fleming, MD Katya Georgiev PA-C Megan Hill, APRN ZENAIDA Aloccer MD Pediatrics   Jonn Castaneda, SORIN Campos, MD Aniya Freeman APRN CNP   MD Liane Huffman MD Deborah Mielke, MD Ondina Carrillo, APRN [facility]      Clinic hours: Monday - Thursday 7 am-7 pm; Fridays 7 am-5 pm.   Urgent care: Monday - Friday 11 am-9 pm; Saturday and Sunday 9 am-5 pm.  Pharmacy : Monday -Thursday 8 am-8 pm; Friday 8 am-6 pm; Saturday and Sunday 9 am-5 pm.     Clinic: [phone]   Pharmacy: [phone]

## 2019-06-06 LAB
ANION GAP SERPL CALCULATED.3IONS-SCNC: 9 MMOL/L (ref 3–14)
BUN SERPL-MCNC: 16 MG/DL (ref 7–30)
CALCIUM SERPL-MCNC: 8.9 MG/DL (ref 8.5–10.1)
CHLORIDE SERPL-SCNC: 107 MMOL/L (ref 94–109)
CO2 SERPL-SCNC: 26 MMOL/L (ref 20–32)
CREAT SERPL-MCNC: 1.02 MG/DL (ref 0.66–1.25)
CREAT UR-MCNC: 172 MG/DL
GFR SERPL CREATININE-BSD FRML MDRD: 77 ML/MIN/{1.73_M2}
GLUCOSE SERPL-MCNC: 87 MG/DL (ref 70–99)
LDLC SERPL DIRECT ASSAY-MCNC: 119 MG/DL
MICROALBUMIN UR-MCNC: 10 MG/L
MICROALBUMIN/CREAT UR: 5.79 MG/G CR (ref 0–17)
POTASSIUM SERPL-SCNC: 4.1 MMOL/L (ref 3.4–5.3)
SODIUM SERPL-SCNC: 142 MMOL/L (ref 133–144)

## 2019-06-10 NOTE — RESULT ENCOUNTER NOTE
Sergio, your test results were within normal limits.  Urine sample did not show any abnormal protein.  LDL cholesterol is at goal for you.  Electrolytes, glucose and kidney function are normal.  Plan of care and follow up as discussed in clinic.     Please do not hesitate to call us at [phone] if you have any questions or concerns.    Thank you,    Linda Ho MD MPH

## 2019-10-03 ENCOUNTER — HEALTH MAINTENANCE LETTER (OUTPATIENT)
Age: 64
End: 2019-10-03

## 2020-02-08 ENCOUNTER — HEALTH MAINTENANCE LETTER (OUTPATIENT)
Age: 65
End: 2020-02-08

## 2020-07-17 DIAGNOSIS — I10 ESSENTIAL HYPERTENSION WITH GOAL BLOOD PRESSURE LESS THAN 130/80: ICD-10-CM

## 2020-07-21 RX ORDER — AMLODIPINE BESYLATE 5 MG/1
TABLET ORAL
Qty: 90 TABLET | Refills: 0 | Status: SHIPPED | OUTPATIENT
Start: 2020-07-21 | End: 2021-06-18

## 2020-07-21 NOTE — TELEPHONE ENCOUNTER
90 day randell refill given.  Has been 1 year since last visit and labs, needs visit for any additional refills.    Delmi Argueta RN  [facility]

## 2020-10-28 DIAGNOSIS — I10 ESSENTIAL HYPERTENSION WITH GOAL BLOOD PRESSURE LESS THAN 130/80: ICD-10-CM

## 2020-10-28 NOTE — LETTER
Sergio Davis  [address]  [facility] [id number]          10/30/20      Dear Sergio Davis        At [facility] we care about your health and are committed to providing quality patient care. Regular appointments are a vital part of the care and management of your health and can help prevent many of the complications that can occur.      We are unable to refill your medication(s)  We will need you to schedule a Video Visit before any additional refills can be given.  Please call [phone] to schedule this appointment.      Thank you,    [facility]

## 2020-10-29 RX ORDER — AMLODIPINE BESYLATE 5 MG/1
TABLET ORAL
Qty: 90 TABLET | Refills: 0 | OUTPATIENT
Start: 2020-10-29

## 2020-10-29 NOTE — TELEPHONE ENCOUNTER
This writer attempted to contact Patient on 10/29/20      Reason for call needs visit and left message.      If patient calls back:   Schedule Video appointment for refill within 1 week with primary care, document that pt called and close encounter         Amanda Byrd MA

## 2020-10-29 NOTE — TELEPHONE ENCOUNTER
Routing refill request to provider for review/approval because:  Labs not current:  creatinine  Patient needs to be seen because it has been more than 1 year since last office visit.  BP failed.    Mariel Seymour RN, [facility] Triage

## 2020-10-30 NOTE — TELEPHONE ENCOUNTER
No appointment made sent letter.  Amanda Byrd MA  [facility]  2nd Floor  Primary Care

## 2020-11-07 ENCOUNTER — HEALTH MAINTENANCE LETTER (OUTPATIENT)
Age: 65
End: 2020-11-07

## 2021-03-21 ENCOUNTER — HEALTH MAINTENANCE LETTER (OUTPATIENT)
Age: 66
End: 2021-03-21

## 2021-03-31 ENCOUNTER — OFFICE VISIT (OUTPATIENT)
Dept: FAMILY MEDICINE | Facility: CLINIC | Age: 66
End: 2021-03-31
Payer: COMMERCIAL

## 2021-03-31 VITALS
DIASTOLIC BLOOD PRESSURE: 80 MMHG | OXYGEN SATURATION: 100 % | HEIGHT: 77 IN | BODY MASS INDEX: 33.59 KG/M2 | TEMPERATURE: 97.6 F | HEART RATE: 76 BPM | SYSTOLIC BLOOD PRESSURE: 138 MMHG | WEIGHT: 284.5 LBS

## 2021-03-31 DIAGNOSIS — B35.6 TINEA CRURIS: ICD-10-CM

## 2021-03-31 DIAGNOSIS — Z00.01 ENCOUNTER FOR PREVENTATIVE ADULT HEALTH CARE EXAM WITH ABNORMAL FINDINGS: ICD-10-CM

## 2021-03-31 DIAGNOSIS — Z82.49 FAMILY HISTORY OF ISCHEMIC HEART DISEASE: ICD-10-CM

## 2021-03-31 DIAGNOSIS — Z00.01 ENCOUNTER FOR PREVENTATIVE ADULT HEALTH CARE EXAM WITH ABNORMAL FINDINGS: Primary | ICD-10-CM

## 2021-03-31 DIAGNOSIS — Z28.20 VACCINE REFUSED BY PATIENT: ICD-10-CM

## 2021-03-31 LAB
ALBUMIN SERPL-MCNC: 4.3 G/DL (ref 3.4–5)
ALP SERPL-CCNC: 56 U/L (ref 40–150)
ALT SERPL W P-5'-P-CCNC: 11 U/L (ref 0–70)
ANION GAP SERPL CALCULATED.3IONS-SCNC: 9 MMOL/L (ref 3–14)
AST SERPL W P-5'-P-CCNC: 16 U/L (ref 0–45)
BILIRUB SERPL-MCNC: 0.4 MG/DL (ref 0.2–1.3)
BUN SERPL-MCNC: 13 MG/DL (ref 7–30)
CALCIUM SERPL-MCNC: 9.3 MG/DL (ref 8.5–10.1)
CHLORIDE SERPL-SCNC: 107 MMOL/L (ref 94–109)
CHOLEST SERPL-MCNC: 217 MG/DL
CO2 SERPL-SCNC: 26 MMOL/L (ref 20–32)
CREAT SERPL-MCNC: 0.92 MG/DL (ref 0.66–1.25)
GFR SERPL CREATININE-BSD FRML MDRD: 86 ML/MIN/{1.73_M2}
GLUCOSE SERPL-MCNC: 94 MG/DL (ref 70–99)
HDLC SERPL-MCNC: 44 MG/DL
LDLC SERPL CALC-MCNC: 122 MG/DL
NONHDLC SERPL-MCNC: 173 MG/DL
POTASSIUM SERPL-SCNC: 3.9 MMOL/L (ref 3.4–5.3)
PROT SERPL-MCNC: 7.9 G/DL (ref 6.8–8.8)
SODIUM SERPL-SCNC: 142 MMOL/L (ref 133–144)
TRIGL SERPL-MCNC: 256 MG/DL

## 2021-03-31 PROCEDURE — 36415 COLL VENOUS BLD VENIPUNCTURE: CPT | Performed by: FAMILY MEDICINE

## 2021-03-31 PROCEDURE — 80061 LIPID PANEL: CPT | Performed by: FAMILY MEDICINE

## 2021-03-31 PROCEDURE — 80053 COMPREHEN METABOLIC PANEL: CPT | Performed by: FAMILY MEDICINE

## 2021-03-31 PROCEDURE — G0103 PSA SCREENING: HCPCS | Performed by: FAMILY MEDICINE

## 2021-03-31 PROCEDURE — 99397 PER PM REEVAL EST PAT 65+ YR: CPT | Performed by: FAMILY MEDICINE

## 2021-03-31 ASSESSMENT — MIFFLIN-ST. JEOR: SCORE: 2187.36

## 2021-03-31 NOTE — PROGRESS NOTES
"  SUBJECTIVE:   Sergio Davis is a 65 year old male who presents for Preventive Visit.    Patient has been advised of split billing requirements and indicates understanding: Yes  Are you in the first 12 months of your Medicare Part B coverage?  No    Physical Health:    In general, how would you rate your overall physical health? good    Outside of work, how many days during the week do you exercise? 6-7 days/week    Outside of work, approximately how many minutes a day do you exercise?30-45 minutes    If you drink alcohol do you typically have >3 drinks per day or >7 drinks per week? Not Applicable    Do you usually eat at least 4 servings of fruit and vegetables a day, include whole grains & fiber and avoid regularly eating high fat or \"junk\" foods? Yes    Do you have any problems taking medications regularly?  No    Do you have any side effects from medications? none    Needs assistance for the following daily activities: no assistance needed    Which of the following safety concerns are present in your home?  none identified     Hearing impairment: No    In the past 6 months, have you been bothered by leaking of urine? no    Mental Health:    In general, how would you rate your overall mental or emotional health? excellent  PHQ-2 Score:      Do you feel safe in your environment? Yes    Have you ever done Advance Care Planning? (For example, a Health Directive, POLST, or a discussion with a medical provider or your loved ones about your wishes): No, advance care planning information given to patient to review.  Patient declined advance care planning discussion at this time.    Additional concerns to address?  YES    Fall risk:  Fallen 2 or more times in the past year?: No  Any fall with injury in the past year?: No    Cognitive Screening:     Do you have sleep apnea, excessive snoring or daytime drowsiness?: yes    Rash  Duration of complaint: years   Description:   Location: groin  Character: nicolasa cortez, " "red  Itching (Pruritis): YES- on and off   Progression of Symptoms:  same and waxing and waning  Accompanying Signs & Symptoms:  Fever: no   Body aches or joint pain: no   Sore throat symptoms: no   Recent cold symptoms: no   History:   Previous similar rash: YES  Precipitating factors:   Exposure to similar rash: no   New exposures: None   Recent travel: no   Alleviating factors: 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 Yes     Are you following a low salt diet? Yes    Are your blood pressures ever more than 140 on the top number (systolic) OR more   than 90 on the bottom number (diastolic), for example 140/90? No not since running out/stopping amlodipine      Reviewed and updated as needed this visit by clinical staff  Tobacco   Meds   Med Hx  Surg Hx  Fam Hx  Soc Hx        Reviewed and updated as needed this visit by Provider                Social History     Tobacco Use     Smoking status: Never Smoker     Smokeless tobacco: Never Used     Tobacco comment: no second hand smoke   Substance Use Topics     Alcohol use: Yes     Comment: \"very little\"                           Current providers sharing in care for this patient include:   Patient Care Team:  Toñito Simpson MD as PCP - General (Internal Medicine)  Linda Ho MD as Assigned PCP    The following health maintenance items are reviewed in Epic and correct as of today:  Health Maintenance   Topic Date Due     COVID-19 Vaccine (1) Never done     HEPATITIS C SCREENING  Never done     DTAP/TDAP/TD IMMUNIZATION (1 - Tdap) Never done     ZOSTER IMMUNIZATION (1 of 2) Never done     COLORECTAL CANCER SCREENING  01/10/2019     FALL RISK ASSESSMENT  Never done     Pneumococcal Vaccine: 65+ Years (1 of 1 - PPSV23) Never done     AORTIC ANEURYSM SCREENING (SYSTEM ASSIGNED)  Never done     INFLUENZA VACCINE (1) Never done " "    ADVANCE CARE PLANNING  10/20/2021     MEDICARE ANNUAL WELLNESS VISIT  03/31/2022     LIPID  01/11/2023     PHQ-2  Completed     Pneumococcal Vaccine: Pediatrics (0 to 5 Years) and At-Risk Patients (6 to 64 Years)  Aged Out     IPV IMMUNIZATION  Aged Out     MENINGITIS IMMUNIZATION  Aged Out     HEPATITIS B IMMUNIZATION  Aged Out     Lab work is in process    ROS:  Constitutional, HEENT, cardiovascular, pulmonary, gi and gu systems are negative, except as otherwise noted.    OBJECTIVE:   /80   Pulse 76   Temp 97.6  F (36.4  C) (Temporal)   Ht 1.955 m (6' 4.97\")   Wt 129 kg (284 lb 8 oz)   SpO2 100%   BMI 33.76 kg/m   Estimated body mass index is 33.76 kg/m  as calculated from the following:    Height as of this encounter: 1.955 m (6' 4.97\").    Weight as of this encounter: 129 kg (284 lb 8 oz).  EXAM:   GENERAL: healthy, alert and no distress  EYES: Eyes grossly normal to inspection, PERRL and conjunctivae and sclerae normal  HENT: ear canals and TM's normal, nose and mouth without ulcers or lesions  NECK: no adenopathy, no asymmetry, masses, or scars and thyroid normal to palpation  RESP: lungs clear to auscultation - no rales, rhonchi or wheezes  CV: regular rate and rhythm, normal S1 S2, no S3 or S4, no murmur, click or rub, no peripheral edema and peripheral pulses strong  ABDOMEN: soft, nontender, no hepatosplenomegaly, no masses and bowel sounds normal   (male): normal male genitalia without lesions or urethral discharge, no hernia  RECTAL: normal sphincter tone, no rectal masses, prostate normal size, smooth, nontender without nodules or masses  MS: no gross musculoskeletal defects noted, no edema  SKIN: maculopapular eruption - buttocks and groin  NEURO: Normal strength and tone, mentation intact and speech normal  PSYCH: mentation appears normal, affect normal/bright    Diagnostic Test Results:  Labs reviewed in Epic    ASSESSMENT / PLAN:       ICD-10-CM    1. Encounter for preventative " "adult health care exam with abnormal findings  Z00.01 Comprehensive metabolic panel     Lipid panel reflex to direct LDL Fasting     Fecal colorectal cancer screen (FIT)     **Prostate spec antigen screen FUTURE anytime   2. Tinea cruris  B35.6    3. Family history of ischemic heart disease  Z82.49 Echocardiogram Exercise Stress   4. Vaccine refused by patient  Z28.20        Patient has been advised of split billing requirements and indicates understanding: Yes    COUNSELING:  Reviewed preventive health counseling, as reflected in patient instructions       Consider AAA screening for ages 65-75 and smoking history       Regular exercise       Healthy diet/nutrition       Vision screening       Colon cancer screening       Prostate cancer screening    Estimated body mass index is 33.76 kg/m  as calculated from the following:    Height as of this encounter: 1.955 m (6' 4.97\").    Weight as of this encounter: 129 kg (284 lb 8 oz).    Weight management plan: Discussed healthy diet and exercise guidelines    He reports that he has never smoked. He has never used smokeless tobacco.    Appropriate preventive services were discussed with this patient, including applicable screening as appropriate for cardiovascular disease, diabetes, osteopenia/osteoporosis, and glaucoma.  As appropriate for age/gender, discussed screening for colorectal cancer, prostate cancer, breast cancer, and cervical cancer. Checklist reviewing preventive services available has been given to the patient.    Reviewed patients plan of care and provided an AVS. The Basic Care Plan (routine screening as documented in Health Maintenance) for Sergio meets the Care Plan requirement. This Care Plan has been established and reviewed with the Patient.    Check blood pressure cuff for accuracy  Call [phone] to schedule exercise stress echo at [facility] (needs bike, not treadmill) and ultrasound for aneurysm screening    Willard Guzman MD  M " [facility]

## 2021-03-31 NOTE — PATIENT INSTRUCTIONS
Check blood pressure cuff for accuracy  Call [phone] to schedule exercise stress echo at [facility] (needs bike, not treadmill) and ultrasound for aneurysm screening    Patient Education   Personalized Prevention Plan  You are due for the preventive services outlined below.  Your care team is available to assist you in scheduling these services.  If you have already completed any of these items, please share that information with your care team to update in your medical record.  Health Maintenance Due   Topic Date Due     COVID-19 Vaccine (1) Never done     Hepatitis C Screening  Never done     Diptheria Tetanus Pertussis (DTAP/TDAP/TD) Vaccine (1 - Tdap) Never done     Zoster (Shingles) Vaccine (1 of 2) Never done     Colorectal Cancer Screening  01/10/2019     FALL RISK ASSESSMENT  Never done     Pneumococcal Vaccine (1 of 1 - PPSV23) Never done     AORTIC ANEURYSM SCREENING (SYSTEM ASSIGNED)  Never done     Flu Vaccine (1) Never done     PHQ-2  01/01/2021

## 2021-04-01 PROBLEM — Z82.49 FAMILY HISTORY OF ISCHEMIC HEART DISEASE: Status: ACTIVE | Noted: 2021-04-01

## 2021-04-01 LAB — PSA SERPL-ACNC: 0.61 UG/L (ref 0–4)

## 2021-04-02 DIAGNOSIS — E78.5 HYPERLIPIDEMIA LDL GOAL <100: Primary | ICD-10-CM

## 2021-04-02 RX ORDER — ATORVASTATIN CALCIUM 10 MG/1
10 TABLET, FILM COATED ORAL DAILY
Qty: 90 TABLET | Refills: 3 | Status: SHIPPED | OUTPATIENT
Start: 2021-04-02 | End: 2021-08-06

## 2021-04-02 NOTE — RESULT ENCOUNTER NOTE
David Hill: Although your bad LDL cholesterol has dropped somewhat it is still above the desirable goal of an LDL less than 100.  With the positive family history and your 10-year risk being greater than 20% which is considered high risk I recommend starting cholesterol medication atorvastatin 10 mg once daily.  Please let me know if you are willing to get this ago.  Also eating Mediterranean or Dash type diets which somewhat limit animal products fats can be helpful along with sitting less and walking more.  All labs should be rechecked in any case next year.  Please contact if any questions. Continue to stay healthy; nice to see you!     Willard   The 10-year ASCVD risk score (Gigiangelo VALE Jr., et al., 2013) is: 20.2%    Values used to calculate the score:      Age: 65 years      Sex: Male      Is Non- : No      Diabetic: No      Tobacco smoker: No      Systolic Blood Pressure: 138 mmHg      Is BP treated: Yes      HDL Cholesterol: 44 mg/dL      Total Cholesterol: 217 mg/dL

## 2021-06-17 ENCOUNTER — HOSPITAL ENCOUNTER (OUTPATIENT)
Dept: CARDIOLOGY | Facility: CLINIC | Age: 66
End: 2021-06-17
Attending: FAMILY MEDICINE
Payer: COMMERCIAL

## 2021-06-17 ENCOUNTER — HOSPITAL ENCOUNTER (OUTPATIENT)
Dept: ULTRASOUND IMAGING | Facility: CLINIC | Age: 66
End: 2021-06-17
Attending: FAMILY MEDICINE
Payer: COMMERCIAL

## 2021-06-17 DIAGNOSIS — I51.81 STRESS-INDUCED CARDIOMYOPATHY: Primary | ICD-10-CM

## 2021-06-17 DIAGNOSIS — Z82.49 FAMILY HISTORY OF ISCHEMIC HEART DISEASE: ICD-10-CM

## 2021-06-17 PROCEDURE — 76706 US ABDL AORTA SCREEN AAA: CPT

## 2021-06-17 PROCEDURE — 76706 US ABDL AORTA SCREEN AAA: CPT | Mod: 26 | Performed by: RADIOLOGY

## 2021-06-17 PROCEDURE — 255N000002 HC RX 255 OP 636: Performed by: INTERNAL MEDICINE

## 2021-06-17 PROCEDURE — 999N000208 ECHO STRESS ECHOCARDIOGRAM

## 2021-06-17 RX ADMIN — PERFLUTREN 5 ML: 6.52 INJECTION, SUSPENSION INTRAVENOUS at 08:22

## 2021-06-17 NOTE — RESULT ENCOUNTER NOTE
David Hill: 2 review our phone call-Dr. Marciano Mireles called me with stress echo results that were positive.  As you were asymptomatic and the findings returned to normal by the end of the test he recommended a cardiology consultation.  He mentioned he had an opening tomorrow, cardiology clinic number to schedule is [phone].  Contact me if any difficulties setting this up.  Should you feel unwell and developed symptoms such as chest pressure or difficulty breathing, recommend getting right to an emergency room, [place] if possible.     Willard

## 2021-06-18 ENCOUNTER — OFFICE VISIT (OUTPATIENT)
Dept: CARDIOLOGY | Facility: CLINIC | Age: 66
End: 2021-06-18
Attending: INTERNAL MEDICINE
Payer: COMMERCIAL

## 2021-06-18 VITALS
DIASTOLIC BLOOD PRESSURE: 73 MMHG | HEART RATE: 66 BPM | HEIGHT: 76 IN | SYSTOLIC BLOOD PRESSURE: 127 MMHG | WEIGHT: 274.3 LBS | BODY MASS INDEX: 33.4 KG/M2 | OXYGEN SATURATION: 99 %

## 2021-06-18 DIAGNOSIS — R94.39 ABNORMAL STRESS TEST: Primary | ICD-10-CM

## 2021-06-18 DIAGNOSIS — Z11.59 ENCOUNTER FOR SCREENING FOR OTHER VIRAL DISEASES: ICD-10-CM

## 2021-06-18 DIAGNOSIS — E78.5 HYPERLIPIDEMIA LDL GOAL <100: ICD-10-CM

## 2021-06-18 DIAGNOSIS — Z82.49 FAMILY HISTORY OF ISCHEMIC HEART DISEASE: ICD-10-CM

## 2021-06-18 LAB — INTERPRETATION ECG - MUSE: NORMAL

## 2021-06-18 PROCEDURE — G0463 HOSPITAL OUTPT CLINIC VISIT: HCPCS | Mod: 25

## 2021-06-18 PROCEDURE — 99205 OFFICE O/P NEW HI 60 MIN: CPT | Performed by: INTERNAL MEDICINE

## 2021-06-18 PROCEDURE — 93005 ELECTROCARDIOGRAM TRACING: CPT

## 2021-06-18 RX ORDER — METOPROLOL SUCCINATE 25 MG/1
25 TABLET, EXTENDED RELEASE ORAL DAILY
Qty: 30 TABLET | Refills: 3 | Status: SHIPPED | OUTPATIENT
Start: 2021-06-18 | End: 2021-11-01

## 2021-06-18 ASSESSMENT — PAIN SCALES - GENERAL: PAINLEVEL: NO PAIN (0)

## 2021-06-18 ASSESSMENT — MIFFLIN-ST. JEOR: SCORE: 2125.72

## 2021-06-18 NOTE — PROGRESS NOTES
"CARDIOLOGY CLINIC INITIAL CONSULTATION    HPI:  Sergio Davis is a 65 year old male who receives primary care fr Dr. Willard Guzman.  He was referred to Cardiology clinic after an abnormal stress test on 2021.    Sergio is a pleasant man with no clinical history of atherosclerotic CVD. CVD risk factors include HTN well-controlled without medications, HLD, family history of CAD.  Over the past year he has noticed mild progression of dyspnea on exertion.  He reports shortness of breath after 1 flight of stairs.  He rides his bike casually around the neighborhood, but does not do any more exertional work.  He also feels more fatigued than usual at the end of the day.  No exertional or rest chest pain.  No palpitations, orthopnea, PND, LE edema.    Dr. Guzman ordered an exercise stress echo which showed normal resting LV function but a reduction in LV function at peak exercise with anterior wall motion abnormalities.  Findings are concerning for LAD stenosis.  Sergio did not have any symptoms during the test and his LV function returned to normal with rest.    Atorvastatin was ordered by Dr. Guzman, but Sergio has not started this medication.  At one point he was on amlodipine but he stopped this due to LE edema.  Despite being off amlodipine, his blood pressure is normal today.    Sergio's father  age 61 of a \"collapsed artery\" in his heart.  Sergio is a never smoker.    The 10-year ASCVD risk score ([place] DC Jr., et al., 2013) is: 15.3%    Values used to calculate the score:      Age: 65 years      Sex: Male      Is Non- : No      Diabetic: No      Tobacco smoker: No      Systolic Blood Pressure: 127 mmHg      Is BP treated: No      HDL Cholesterol: 44 mg/dL      Total Cholesterol: 217 mg/dL     ASSESSMENT AND PLAN  Sergio Davis is a 65 year old male with HTN, HLD and family history of CAD.  He has had progressive dyspnea on exertion over the past year or so and an abnormal " stress test concerning for ischemia in the LAD territory.  We discussed coronary angiogram as the next step in evaluation and Sergio agrees to move forward with this test.  In the meantime I asked him to start the atorvastatin as already ordered to have aspirin. We will also start a low dose beta blocker.     Plan:  - Coronary angiogram  - Start atorvastatin 10 mg daily  - Start aspirin 81 mg daily  - Start metoprolol 25mg daily.   - I will review the results of the angiogram and determine timing for clinical follow-up    Thank you for the opportunity to participate in the care of Mr. Sergio Davis. Please feel free to contact me with any additional questions or concerns.    Rell Mireles MD    Preventive Cardiology  Pager: [phone]    PAST MEDICAL HISTORY:  Patient Active Problem List   Diagnosis     S/P complete repair of rotator cuff     Hyperlipidemia LDL goal <100     BMI 33.0-33.9,adult     Cataract of left eye     Vaccine refused by patient     Tinea cruris     Family history of ischemic heart disease     Abnormal stress test     Past Medical History:   Diagnosis Date     BMI 33.0-33.9,adult 10/24/2016     Rotator cuff tear        CURRENT MEDICATIONS:  Current Outpatient Medications   Medication Sig Dispense Refill     ORDER FOR [facility] Respironics REMSTAR 60 Series Auto CPAP 6cm H2O, [place] Fx wide nasal mask w/chinstrap       atorvastatin (LIPITOR) 10 MG tablet Take 1 tablet (10 mg) by mouth daily (Patient not taking: Reported on 6/18/2021) 90 tablet 3       PAST SURGICAL HISTORY:  Past Surgical History:   Procedure Laterality Date     ARTHROSCOPY SHOULDER ROTATOR CUFF REPAIR      10/25/10       ALLERGIES  Patient has no known allergies.    FAMILY HX:  Family History   Problem Relation Age of Onset     Myocardial Infarction Father 61     Cancer Sister         Hodgekin's Lymphoma     Diabetes No family hx of      Hypertension No family hx of      Cerebrovascular Disease No family hx  "of      MARISSA No family hx of      Heart Failure No family hx of        SOCIAL HX:  Social History     Tobacco Use     Smoking status: Never Smoker     Smokeless tobacco: Never Used     Tobacco comment: no second hand smoke   Substance Use Topics     Alcohol use: Yes     Comment: \"very little\"     Drug use: No        ROS:  Comprehensive ROS is negative except as noted in HPI.    VITAL SIGNS:  /73 (BP Location: Right arm, Patient Position: Chair, Cuff Size: Adult Large)   Pulse 66   Ht 1.93 m (6' 4\")   Wt 124.4 kg (274 lb 4.8 oz)   SpO2 99%   BMI 33.39 kg/m    Body mass index is 33.39 kg/m .  Wt Readings from Last 2 Encounters:   06/18/21 124.4 kg (274 lb 4.8 oz)   03/31/21 129 kg (284 lb 8 oz)       PHYSICAL EXAM  Gen: pleasant man sitting comfortably in NAD  Head: nc/at  Eyes: EOMI  ENT:  trachea is midline  Neck: supple, no lymphadenopathy  CV: nml s1/s2, no murmur or gallop; no JVD  Chest: clear lungs  Abd: soft, NT, NABS  Ext: warm, no LE edema  Skin: no rash on limited exam  Neuro: awake, alert, oriented, nml speech and gait  Vasc: 2+ bilateral carotid, brachial, radial, DP and PT pulses; no bruits noted    LABS: personally reviewed  CMP  Recent Labs   Lab Test 03/31/21  0940 06/05/19  1844 01/11/18  0843 10/20/16  1029    142 140 139   POTASSIUM 3.9 4.1 4.2 4.3   CHLORIDE 107 107 106 104   CO2 26 26 28 30   ANIONGAP 9 9 6 5   GLC 94 87 95 92   BUN 13 16 15 14   CR 0.92 1.02 1.03 1.02   GFRESTIMATED 86 77 73 74   GFRESTBLACK >90 89 88 90   VANESSA 9.3 8.9 8.9 9.3   PROTTOTAL 7.9  --  7.5 7.5   ALBUMIN 4.3  --  4.1 4.1   BILITOTAL 0.4  --  0.4 0.3   ALKPHOS 56  --  55 58   AST 16  --  17 17   ALT 11  --  12 13     CBC  Recent Labs   Lab Test 10/20/16  1029   WBC 4.7   RBC 4.57   HGB 13.5   HCT 40.6   MCV 89   MCH 29.5   MCHC 33.3   RDW 14.0        INR  Recent Labs   Lab Test 10/20/16  1029   INR 0.92     Recent Labs   Lab Test 03/31/21  0940 06/05/19  1844 01/11/18  0843   CHOL 217*  --  " 209*   HDL 44  --  50   * 119* 144*   TRIG 256*  --  77     No lab results found.    Most recent EKG: reviewed and discussed with the patient  6/18/2021: NSR, frequent PACs, no ischemic changes    Most recent ECHO: See below    Most recent STRESS TEST:  reviewed and discussed with the patient  6/17/21 Exercise stress echo  Abnormal exercise echocardiogram with large areas of ischemia in the anterior and anterolateral segments. Patient was asymptomatic throughout the test and LV function returned to normal at the end of the study. Dr. Guzman was updated and patient was discharged with recommendation for cardiology consultation.     The target heart rate was achieved. Normal heart rate and BP response to exercise.  Normal biventricular size, thickness, and global systolic function at  baseline, LVEF=60-65%.  With exercise, LVEF decreased to 50-55% and LV cavity size did not decrease appropriately.  Mid to distal anterior and anteroseptal and apical hypokinesis at peak  exercise. Concern for proximal LAD or multivessel CAD.  No angina was elicited.  No ECG evidence of ischemia.  Functional capacity is normal for age.  No significant valvular abnormalities are noted on screening Doppler exam.  Ascending aorta is dilated 4.3cm.    Most recent CARDIAC CATH: Pending

## 2021-06-18 NOTE — NURSING NOTE
Chief Complaint   Patient presents with     New Patient     please obtain 12-lead ECG     Vitals were taken and medications where reconciled. EKG was performed   ANAND Vines  1:05 PM

## 2021-06-18 NOTE — PATIENT INSTRUCTIONS
"You were seen today in the Cardiovascular Clinic at the [facility].     Cardiology Providers you saw during your visit: Dr. Rell Mireles    Diagnosis:   Encounter Diagnoses   Name Primary?     Abnormal stress test Yes     Hyperlipidemia LDL goal <100      BMI 33.0-33.9,adult      Family history of ischemic heart disease           Orders:   Orders Placed This Encounter   Procedures     EKG 12-lead, tracing only (Same Day)       Recommendations:   1. Schedule a coronary angiogram when you are able.  2. Start the atorvastatin 10mg daily.   3. Start aspirin 81mg daily.   4. Start metoprolol 25mg daily.  5. I will follow up the angiogram results and decide follow at that time.        Please feel free to call me with any questions or concerns.       Delmi JEFFERY LPN     Questions: [phone]  First press #1 for [employer] for \"Medical Questions\" to reach us Cardiology Nurses.   [phone]   First press #1 for the [employer] and then press #1     On Call Cardiologist for after hours or on weekends: [phone]   option #4 and ask to speak to the on-call Cardiologist.          If you need a medication refill please contact your pharmacy.  Please allow 3 business days for your refill to be completed.  --------------------------------------------------------------    "

## 2021-06-18 NOTE — LETTER
"2021      RE: Sergio Davis  [address]  [facility] [address]       Dear Colleague,    Thank you for the opportunity to participate in the care of your patient, Sergio Davis, at the [facility] [place] [facility]. Please see a copy of my visit note below.    CARDIOLOGY CLINIC INITIAL CONSULTATION    HPI:  Sergio Davis is a 65 year old male who receives primary care fr Dr. Willard Guzman.  He was referred to Cardiology clinic after an abnormal stress test on 2021.    Sergio is a pleasant man with no clinical history of atherosclerotic CVD. CVD risk factors include HTN well-controlled without medications, HLD, family history of CAD.  Over the past year he has noticed mild progression of dyspnea on exertion.  He reports shortness of breath after 1 flight of stairs.  He rides his bike casually around the neighborhood, but does not do any more exertional work.  He also feels more fatigued than usual at the end of the day.  No exertional or rest chest pain.  No palpitations, orthopnea, PND, LE edema.    Dr. Guzman ordered an exercise stress echo which showed normal resting LV function but a reduction in LV function at peak exercise with anterior wall motion abnormalities.  Findings are concerning for LAD stenosis.  Sergio did not have any symptoms during the test and his LV function returned to normal with rest.    Atorvastatin was ordered by Dr. Guzman, but Sergio has not started this medication.  At one point he was on amlodipine but he stopped this due to LE edema.  Despite being off amlodipine, his blood pressure is normal today.    Sergio's father  age 61 of a \"collapsed artery\" in his heart.  Sergio is a never smoker.    The 10-year ASCVD risk score (Gigi KAVIN Jr., et al., 2013) is: 15.3%    Values used to calculate the score:      Age: 65 years      Sex: Male      Is Non- : No      " Diabetic: No      Tobacco smoker: No      Systolic Blood Pressure: 127 mmHg      Is BP treated: No      HDL Cholesterol: 44 mg/dL      Total Cholesterol: 217 mg/dL     ASSESSMENT AND PLAN  Sergio Davis is a 65 year old male with HTN, HLD and family history of CAD.  He has had progressive dyspnea on exertion over the past year or so and an abnormal stress test concerning for ischemia in the LAD territory.  We discussed coronary angiogram as the next step in evaluation and Sergio agrees to move forward with this test.  In the meantime I asked him to start the atorvastatin as already ordered to have aspirin. We will also start a low dose beta blocker.     Plan:  - Coronary angiogram  - Start atorvastatin 10 mg daily  - Start aspirin 81 mg daily  - Start metoprolol 25mg daily.   - I will review the results of the angiogram and determine timing for clinical follow-up    Thank you for the opportunity to participate in the care of Mr. Sergio Davis. Please feel free to contact me with any additional questions or concerns.    Rell Mireles MD    Preventive Cardiology  Pager: [phone]    PAST MEDICAL HISTORY:  Patient Active Problem List   Diagnosis     S/P complete repair of rotator cuff     Hyperlipidemia LDL goal <100     BMI 33.0-33.9,adult     Cataract of left eye     Vaccine refused by patient     Tinea cruris     Family history of ischemic heart disease     Abnormal stress test     Past Medical History:   Diagnosis Date     BMI 33.0-33.9,adult 10/24/2016     Rotator cuff tear        CURRENT MEDICATIONS:  Current Outpatient Medications   Medication Sig Dispense Refill     ORDER FOR [facility] Respironics REMSTAR 60 Series Auto CPAP 6cm H2O, [place] Fx wide nasal mask w/chinstrap       atorvastatin (LIPITOR) 10 MG tablet Take 1 tablet (10 mg) by mouth daily (Patient not taking: Reported on 6/18/2021) 90 tablet 3       PAST SURGICAL HISTORY:  Past Surgical History:   Procedure Laterality Date      "ARTHROSCOPY SHOULDER ROTATOR CUFF REPAIR      10/25/10       ALLERGIES  Patient has no known allergies.    FAMILY HX:  Family History   Problem Relation Age of Onset     Myocardial Infarction Father 61     Cancer Sister         Jolie's Lymphoma     Diabetes No family hx of      Hypertension No family hx of      Cerebrovascular Disease No family hx of      C.A.D. No family hx of      Heart Failure No family hx of        SOCIAL HX:  Social History     Tobacco Use     Smoking status: Never Smoker     Smokeless tobacco: Never Used     Tobacco comment: no second hand smoke   Substance Use Topics     Alcohol use: Yes     Comment: \"very little\"     Drug use: No        ROS:  Comprehensive ROS is negative except as noted in HPI.    VITAL SIGNS:  /73 (BP Location: Right arm, Patient Position: Chair, Cuff Size: Adult Large)   Pulse 66   Ht 1.93 m (6' 4\")   Wt 124.4 kg (274 lb 4.8 oz)   SpO2 99%   BMI 33.39 kg/m    Body mass index is 33.39 kg/m .  Wt Readings from Last 2 Encounters:   06/18/21 124.4 kg (274 lb 4.8 oz)   03/31/21 129 kg (284 lb 8 oz)       PHYSICAL EXAM  Gen: pleasant man sitting comfortably in NAD  Head: nc/at  Eyes: EOMI  ENT:  trachea is midline  Neck: supple, no lymphadenopathy  CV: nml s1/s2, no murmur or gallop; no JVD  Chest: clear lungs  Abd: soft, NT, NABS  Ext: warm, no LE edema  Skin: no rash on limited exam  Neuro: awake, alert, oriented, nml speech and gait  Vasc: 2+ bilateral carotid, brachial, radial, DP and PT pulses; no bruits noted    LABS: personally reviewed  CMP  Recent Labs   Lab Test 03/31/21  0940 06/05/19  1844 01/11/18  0843 10/20/16  1029    142 140 139   POTASSIUM 3.9 4.1 4.2 4.3   CHLORIDE 107 107 106 104   CO2 26 26 28 30   ANIONGAP 9 9 6 5   GLC 94 87 95 92   BUN 13 16 15 14   CR 0.92 1.02 1.03 1.02   GFRESTIMATED 86 77 73 74   GFRESTBLACK >90 89 88 90   VANESSA 9.3 8.9 8.9 9.3   PROTTOTAL 7.9  --  7.5 7.5   ALBUMIN 4.3  --  4.1 4.1   BILITOTAL 0.4  --  0.4 0.3 "   ALKPHOS 56  --  55 58   AST 16  --  17 17   ALT 11  --  12 13     CBC  Recent Labs   Lab Test 10/20/16  1029   WBC 4.7   RBC 4.57   HGB 13.5   HCT 40.6   MCV 89   MCH 29.5   MCHC 33.3   RDW 14.0        INR  Recent Labs   Lab Test 10/20/16  1029   INR 0.92     Recent Labs   Lab Test 03/31/21  0940 06/05/19  1844 01/11/18  0843   CHOL 217*  --  209*   HDL 44  --  50   * 119* 144*   TRIG 256*  --  77     No lab results found.    Most recent EKG: reviewed and discussed with the patient  6/18/2021: NSR, frequent PACs, no ischemic changes    Most recent ECHO: See below    Most recent STRESS TEST:  reviewed and discussed with the patient  6/17/21 Exercise stress echo  Abnormal exercise echocardiogram with large areas of ischemia in the anterior and anterolateral segments. Patient was asymptomatic throughout the test and LV function returned to normal at the end of the study. Dr. Guzman was updated and patient was discharged with recommendation for cardiology consultation.     The target heart rate was achieved. Normal heart rate and BP response to exercise.  Normal biventricular size, thickness, and global systolic function at  baseline, LVEF=60-65%.  With exercise, LVEF decreased to 50-55% and LV cavity size did not decrease appropriately.  Mid to distal anterior and anteroseptal and apical hypokinesis at peak  exercise. Concern for proximal LAD or multivessel CAD.  No angina was elicited.  No ECG evidence of ischemia.  Functional capacity is normal for age.  No significant valvular abnormalities are noted on screening Doppler exam.  Ascending aorta is dilated 4.3cm.    Most recent CARDIAC CATH: Pending      Please do not hesitate to contact me if you have any questions/concerns.     Sincerely,     Rell Mireles MD

## 2021-07-11 DIAGNOSIS — Z82.49 FAMILY HISTORY OF ISCHEMIC HEART DISEASE: ICD-10-CM

## 2021-07-11 DIAGNOSIS — R94.39 ABNORMAL STRESS TEST: Primary | ICD-10-CM

## 2021-07-11 DIAGNOSIS — Z11.59 ENCOUNTER FOR SCREENING FOR OTHER VIRAL DISEASES: ICD-10-CM

## 2021-07-11 PROCEDURE — U0005 INFEC AGEN DETEC AMPLI PROBE: HCPCS

## 2021-07-11 PROCEDURE — U0003 INFECTIOUS AGENT DETECTION BY NUCLEIC ACID (DNA OR RNA); SEVERE ACUTE RESPIRATORY SYNDROME CORONAVIRUS 2 (SARS-COV-2) (CORONAVIRUS DISEASE [COVID-19]), AMPLIFIED PROBE TECHNIQUE, MAKING USE OF HIGH THROUGHPUT TECHNOLOGIES AS DESCRIBED BY CMS-2020-01-R: HCPCS

## 2021-07-11 RX ORDER — POTASSIUM CHLORIDE 1500 MG/1
20 TABLET, EXTENDED RELEASE ORAL
Status: CANCELLED | OUTPATIENT
Start: 2021-07-11

## 2021-07-11 RX ORDER — SODIUM CHLORIDE 9 MG/ML
INJECTION, SOLUTION INTRAVENOUS CONTINUOUS
Status: CANCELLED | OUTPATIENT
Start: 2021-07-11

## 2021-07-11 RX ORDER — LIDOCAINE 40 MG/G
CREAM TOPICAL
Status: CANCELLED | OUTPATIENT
Start: 2021-07-11

## 2021-07-11 RX ORDER — POTASSIUM CHLORIDE 1500 MG/1
40 TABLET, EXTENDED RELEASE ORAL
Status: CANCELLED | OUTPATIENT
Start: 2021-07-11

## 2021-07-12 LAB — SARS-COV-2 RNA RESP QL NAA+PROBE: NEGATIVE

## 2021-07-13 ENCOUNTER — TELEPHONE (OUTPATIENT)
Dept: CARDIOLOGY | Facility: CLINIC | Age: 66
End: 2021-07-13

## 2021-07-13 NOTE — TELEPHONE ENCOUNTER
Left voicemail for patient to complete Travel Screen for Cardiac Cath Lab appointment on 7/14 and inform patient of updated Visitor Policy.       covid neg

## 2021-07-14 ENCOUNTER — LAB (OUTPATIENT)
Dept: LAB | Facility: CLINIC | Age: 66
End: 2021-07-14
Attending: INTERNAL MEDICINE
Payer: COMMERCIAL

## 2021-07-14 ENCOUNTER — APPOINTMENT (OUTPATIENT)
Dept: MEDSURG UNIT | Facility: CLINIC | Age: 66
End: 2021-07-14
Attending: INTERNAL MEDICINE
Payer: COMMERCIAL

## 2021-07-14 ENCOUNTER — HOSPITAL ENCOUNTER (OUTPATIENT)
Facility: CLINIC | Age: 66
Discharge: HOME | End: 2021-07-14
Attending: INTERNAL MEDICINE | Admitting: INTERNAL MEDICINE
Payer: COMMERCIAL

## 2021-07-14 VITALS
HEIGHT: 76 IN | DIASTOLIC BLOOD PRESSURE: 79 MMHG | RESPIRATION RATE: 18 BRPM | SYSTOLIC BLOOD PRESSURE: 120 MMHG | BODY MASS INDEX: 32.88 KG/M2 | OXYGEN SATURATION: 99 % | WEIGHT: 270 LBS | TEMPERATURE: 98.4 F | HEART RATE: 51 BPM

## 2021-07-14 DIAGNOSIS — Z82.49 FAMILY HISTORY OF ISCHEMIC HEART DISEASE: ICD-10-CM

## 2021-07-14 DIAGNOSIS — R94.39 ABNORMAL STRESS TEST: ICD-10-CM

## 2021-07-14 DIAGNOSIS — E78.5 HYPERLIPIDEMIA LDL GOAL <100: Primary | ICD-10-CM

## 2021-07-14 PROBLEM — Z98.890 STATUS POST CORONARY ANGIOGRAM: Status: ACTIVE | Noted: 2021-07-14

## 2021-07-14 LAB
ANION GAP SERPL CALCULATED.3IONS-SCNC: 3 MMOL/L (ref 3–14)
BUN SERPL-MCNC: 18 MG/DL (ref 7–30)
CALCIUM SERPL-MCNC: 9.7 MG/DL (ref 8.5–10.1)
CHLORIDE BLD-SCNC: 106 MMOL/L (ref 94–109)
CO2 SERPL-SCNC: 28 MMOL/L (ref 20–32)
CREAT SERPL-MCNC: 1.11 MG/DL (ref 0.66–1.25)
ERYTHROCYTE [DISTWIDTH] IN BLOOD BY AUTOMATED COUNT: 13.7 % (ref 10–15)
GFR SERPL CREATININE-BSD FRML MDRD: 69 ML/MIN/1.73M2
GLUCOSE BLD-MCNC: 93 MG/DL (ref 70–99)
HCT VFR BLD AUTO: 41.6 % (ref 40–53)
HGB BLD-MCNC: 13.7 G/DL (ref 13.3–17.7)
MCH RBC QN AUTO: 29.1 PG (ref 26.5–33)
MCHC RBC AUTO-ENTMCNC: 32.9 G/DL (ref 31.5–36.5)
MCV RBC AUTO: 88 FL (ref 78–100)
PLATELET # BLD AUTO: 203 10E3/UL (ref 150–450)
POTASSIUM BLD-SCNC: 4.2 MMOL/L (ref 3.4–5.3)
RBC # BLD AUTO: 4.71 10E6/UL (ref 4.4–5.9)
SODIUM SERPL-SCNC: 137 MMOL/L (ref 133–144)
WBC # BLD AUTO: 5.2 10E3/UL (ref 4–11)

## 2021-07-14 PROCEDURE — 272N000001 HC OR GENERAL SUPPLY STERILE: Performed by: INTERNAL MEDICINE

## 2021-07-14 PROCEDURE — 93571 IV DOP VEL&/PRESS C FLO 1ST: CPT | Mod: 26 | Performed by: INTERNAL MEDICINE

## 2021-07-14 PROCEDURE — 93571 IV DOP VEL&/PRESS C FLO 1ST: CPT | Performed by: INTERNAL MEDICINE

## 2021-07-14 PROCEDURE — 85027 COMPLETE CBC AUTOMATED: CPT | Performed by: INTERNAL MEDICINE

## 2021-07-14 PROCEDURE — 36415 COLL VENOUS BLD VENIPUNCTURE: CPT | Performed by: INTERNAL MEDICINE

## 2021-07-14 PROCEDURE — 99152 MOD SED SAME PHYS/QHP 5/>YRS: CPT | Mod: 59 | Performed by: INTERNAL MEDICINE

## 2021-07-14 PROCEDURE — 99152 MOD SED SAME PHYS/QHP 5/>YRS: CPT | Performed by: INTERNAL MEDICINE

## 2021-07-14 PROCEDURE — 93572 IV DOP VEL&/PRESS C FLO EA: CPT | Performed by: INTERNAL MEDICINE

## 2021-07-14 PROCEDURE — 93010 ELECTROCARDIOGRAM REPORT: CPT | Performed by: INTERNAL MEDICINE

## 2021-07-14 PROCEDURE — C1769 GUIDE WIRE: HCPCS | Performed by: INTERNAL MEDICINE

## 2021-07-14 PROCEDURE — 250N000011 HC RX IP 250 OP 636: Performed by: INTERNAL MEDICINE

## 2021-07-14 PROCEDURE — 93454 CORONARY ARTERY ANGIO S&I: CPT | Performed by: INTERNAL MEDICINE

## 2021-07-14 PROCEDURE — 93458 L HRT ARTERY/VENTRICLE ANGIO: CPT | Mod: 26 | Performed by: INTERNAL MEDICINE

## 2021-07-14 PROCEDURE — C1894 INTRO/SHEATH, NON-LASER: HCPCS | Performed by: INTERNAL MEDICINE

## 2021-07-14 PROCEDURE — 99153 MOD SED SAME PHYS/QHP EA: CPT | Performed by: INTERNAL MEDICINE

## 2021-07-14 PROCEDURE — 999N000142 HC STATISTIC PROCEDURE PREP ONLY

## 2021-07-14 PROCEDURE — 250N000009 HC RX 250: Performed by: INTERNAL MEDICINE

## 2021-07-14 PROCEDURE — 85347 COAGULATION TIME ACTIVATED: CPT

## 2021-07-14 PROCEDURE — 80048 BASIC METABOLIC PNL TOTAL CA: CPT | Performed by: INTERNAL MEDICINE

## 2021-07-14 PROCEDURE — 272N000002 HC OR SUPPLY OTHER OPNP: Performed by: INTERNAL MEDICINE

## 2021-07-14 PROCEDURE — 250N000013 HC RX MED GY IP 250 OP 250 PS 637: Performed by: INTERNAL MEDICINE

## 2021-07-14 PROCEDURE — C1887 CATHETER, GUIDING: HCPCS | Performed by: INTERNAL MEDICINE

## 2021-07-14 RX ORDER — FENTANYL CITRATE 50 UG/ML
INJECTION, SOLUTION INTRAMUSCULAR; INTRAVENOUS
Status: DISCONTINUED | OUTPATIENT
Start: 2021-07-14 | End: 2021-07-14 | Stop reason: HOSPADM

## 2021-07-14 RX ORDER — HEPARIN SODIUM 1000 [USP'U]/ML
INJECTION, SOLUTION INTRAVENOUS; SUBCUTANEOUS
Status: DISCONTINUED | OUTPATIENT
Start: 2021-07-14 | End: 2021-07-14 | Stop reason: HOSPADM

## 2021-07-14 RX ORDER — POTASSIUM CHLORIDE 750 MG/1
40 TABLET, EXTENDED RELEASE ORAL
Status: DISCONTINUED | OUTPATIENT
Start: 2021-07-14 | End: 2021-07-14 | Stop reason: HOSPADM

## 2021-07-14 RX ORDER — HEPARIN SODIUM 10000 [USP'U]/100ML
100-1000 INJECTION, SOLUTION INTRAVENOUS CONTINUOUS PRN
Status: DISCONTINUED | OUTPATIENT
Start: 2021-07-14 | End: 2021-07-14 | Stop reason: HOSPADM

## 2021-07-14 RX ORDER — ARGATROBAN 1 MG/ML
150 INJECTION, SOLUTION INTRAVENOUS
Status: DISCONTINUED | OUTPATIENT
Start: 2021-07-14 | End: 2021-07-14 | Stop reason: HOSPADM

## 2021-07-14 RX ORDER — ADENOSINE 3 MG/ML
INJECTION, SOLUTION INTRAVENOUS
Status: DISCONTINUED | OUTPATIENT
Start: 2021-07-14 | End: 2021-07-14 | Stop reason: HOSPADM

## 2021-07-14 RX ORDER — DOBUTAMINE HYDROCHLORIDE 200 MG/100ML
2-20 INJECTION INTRAVENOUS CONTINUOUS PRN
Status: DISCONTINUED | OUTPATIENT
Start: 2021-07-14 | End: 2021-07-14 | Stop reason: HOSPADM

## 2021-07-14 RX ORDER — ATROPINE SULFATE 0.1 MG/ML
0.5 INJECTION INTRAVENOUS
Status: DISCONTINUED | OUTPATIENT
Start: 2021-07-14 | End: 2021-07-14 | Stop reason: HOSPADM

## 2021-07-14 RX ORDER — NALOXONE HYDROCHLORIDE 0.4 MG/ML
0.2 INJECTION, SOLUTION INTRAMUSCULAR; INTRAVENOUS; SUBCUTANEOUS
Status: DISCONTINUED | OUTPATIENT
Start: 2021-07-14 | End: 2021-07-14 | Stop reason: HOSPADM

## 2021-07-14 RX ORDER — ACETAMINOPHEN 325 MG/1
650 TABLET ORAL EVERY 4 HOURS PRN
Status: DISCONTINUED | OUTPATIENT
Start: 2021-07-14 | End: 2021-07-14 | Stop reason: HOSPADM

## 2021-07-14 RX ORDER — OXYCODONE HYDROCHLORIDE 10 MG/1
10 TABLET ORAL EVERY 4 HOURS PRN
Status: DISCONTINUED | OUTPATIENT
Start: 2021-07-14 | End: 2021-07-14 | Stop reason: HOSPADM

## 2021-07-14 RX ORDER — LIDOCAINE 40 MG/G
CREAM TOPICAL
Status: DISCONTINUED | OUTPATIENT
Start: 2021-07-14 | End: 2021-07-14 | Stop reason: HOSPADM

## 2021-07-14 RX ORDER — IOPAMIDOL 755 MG/ML
INJECTION, SOLUTION INTRAVASCULAR
Status: DISCONTINUED | OUTPATIENT
Start: 2021-07-14 | End: 2021-07-14 | Stop reason: HOSPADM

## 2021-07-14 RX ORDER — ARGATROBAN 1 MG/ML
350 INJECTION, SOLUTION INTRAVENOUS
Status: DISCONTINUED | OUTPATIENT
Start: 2021-07-14 | End: 2021-07-14 | Stop reason: HOSPADM

## 2021-07-14 RX ORDER — VERAPAMIL HYDROCHLORIDE 2.5 MG/ML
INJECTION, SOLUTION INTRAVENOUS
Status: DISCONTINUED | OUTPATIENT
Start: 2021-07-14 | End: 2021-07-14 | Stop reason: HOSPADM

## 2021-07-14 RX ORDER — NALOXONE HYDROCHLORIDE 0.4 MG/ML
0.4 INJECTION, SOLUTION INTRAMUSCULAR; INTRAVENOUS; SUBCUTANEOUS
Status: DISCONTINUED | OUTPATIENT
Start: 2021-07-14 | End: 2021-07-14 | Stop reason: HOSPADM

## 2021-07-14 RX ORDER — EPTIFIBATIDE 2 MG/ML
2 INJECTION, SOLUTION INTRAVENOUS CONTINUOUS PRN
Status: DISCONTINUED | OUTPATIENT
Start: 2021-07-14 | End: 2021-07-14 | Stop reason: HOSPADM

## 2021-07-14 RX ORDER — FENTANYL CITRATE 50 UG/ML
25 INJECTION, SOLUTION INTRAMUSCULAR; INTRAVENOUS
Status: DISCONTINUED | OUTPATIENT
Start: 2021-07-14 | End: 2021-07-14 | Stop reason: HOSPADM

## 2021-07-14 RX ORDER — DOPAMINE HYDROCHLORIDE 160 MG/100ML
2-20 INJECTION, SOLUTION INTRAVENOUS CONTINUOUS PRN
Status: DISCONTINUED | OUTPATIENT
Start: 2021-07-14 | End: 2021-07-14 | Stop reason: HOSPADM

## 2021-07-14 RX ORDER — EPTIFIBATIDE 2 MG/ML
180 INJECTION, SOLUTION INTRAVENOUS EVERY 10 MIN PRN
Status: DISCONTINUED | OUTPATIENT
Start: 2021-07-14 | End: 2021-07-14 | Stop reason: HOSPADM

## 2021-07-14 RX ORDER — SODIUM CHLORIDE 9 MG/ML
INJECTION, SOLUTION INTRAVENOUS CONTINUOUS
Status: DISCONTINUED | OUTPATIENT
Start: 2021-07-14 | End: 2021-07-14 | Stop reason: HOSPADM

## 2021-07-14 RX ORDER — NITROGLYCERIN 20 MG/100ML
10-200 INJECTION INTRAVENOUS CONTINUOUS PRN
Status: DISCONTINUED | OUTPATIENT
Start: 2021-07-14 | End: 2021-07-14 | Stop reason: HOSPADM

## 2021-07-14 RX ORDER — ASPIRIN 81 MG/1
81 TABLET, CHEWABLE ORAL DAILY
COMMUNITY
End: 2023-05-14

## 2021-07-14 RX ORDER — FLUMAZENIL 0.1 MG/ML
0.2 INJECTION, SOLUTION INTRAVENOUS
Status: DISCONTINUED | OUTPATIENT
Start: 2021-07-14 | End: 2021-07-14 | Stop reason: HOSPADM

## 2021-07-14 RX ORDER — OXYCODONE HYDROCHLORIDE 5 MG/1
5 TABLET ORAL EVERY 4 HOURS PRN
Status: DISCONTINUED | OUTPATIENT
Start: 2021-07-14 | End: 2021-07-14 | Stop reason: HOSPADM

## 2021-07-14 RX ORDER — NITROGLYCERIN 5 MG/ML
VIAL (ML) INTRAVENOUS
Status: DISCONTINUED | OUTPATIENT
Start: 2021-07-14 | End: 2021-07-14 | Stop reason: HOSPADM

## 2021-07-14 RX ORDER — POTASSIUM CHLORIDE 750 MG/1
20 TABLET, EXTENDED RELEASE ORAL
Status: DISCONTINUED | OUTPATIENT
Start: 2021-07-14 | End: 2021-07-14 | Stop reason: HOSPADM

## 2021-07-14 RX ADMIN — ASPIRIN 325 MG: 325 TABLET, COATED ORAL at 13:54

## 2021-07-14 ASSESSMENT — MIFFLIN-ST. JEOR: SCORE: 2106.21

## 2021-07-14 NOTE — DISCHARGE INSTRUCTIONS
Going Home after an Angioplasty or Stent Placement (Cardiac)  ______________________________________________      After you go home:    Have an adult stay with you for 24 hours.    Drink plenty of fluids.    You may eat your normal diet, unless your doctor tells you otherwise.    For 24 hours:    Relax and take it easy.    Do NOT smoke.    Do NOT make any important or legal decisions.    Do NOT drive or operate machines at home or at work.    Do NOT drink alcohol.    Remove the Band-Aid after 24 hours. If there is minor oozing, apply another Band-aid and remove it after 12 hours.    For 2 days, do NOT have sex or do any heavy exercise.    Do NOT take a bath, or use a hot tub or pool for at least 3 days. You may shower.    Care of wrist or arm site  It is normal to have soreness at the puncture site and mild tingling in your hand for up to 3 days.    For 2 days, do not use your hand or arm to support your weight (such as rising from a chair) or bend your wrist (such as lifting a garage door).    For 2 days, do not lift more than 5 pounds or exercise your arm (tennis, golf or bowling).    If you start bleeding from the site in your arm:    Sit down and press firmly on the site with your fingers for 10 minutes. Call your doctor as soon as you can.    If the bleeding stops, sit still and keep your wrist straight for 2 hours.    Medicines    If you have started taking Plavix or Effient, do not stop taking it until you talk to your heart doctor (cardiologist).    If you are on metformin (Glucophage), do not restart it until you have blood tests (within 2 to 3 days after discharge). When your doctor tells you it is safe, you may restart the metformin.    If you have stopped any other medicines, check with your nurse or provider about when to restart them.    Call 911 right away if you have bleeding that is heavy or does not stop.    Call your doctor if:    You have a large or growing hard lump around the site.    The  site is red, swollen, hot or tender.    Blood or fluid is draining from the site.    You have chills or a fever greater than 101 F (38 C).    Your leg or arm feels numb or cool.    You have hives, a rash or unusual itching.      [facility] [place]:  [phone] (7 days a week)

## 2021-07-14 NOTE — PROGRESS NOTES
D/I/A: Pt roomed on 3C in bay 31.  Arrived via litter and accompanied by CCL RN ON monitor upon arrival.  VSSA.  Rhythm upon arrival Sinus Reagan on monitor.  Denies pain or sob.  Reviewed activity restrictions and when to notify RN, ie-changes to breathing or increased chest pressure or chest pain.  CCL access:  R TR Band 15 cc.  P: Continue to monitor status.  Discharge to home once meeting criteria.

## 2021-07-14 NOTE — Clinical Note
dry, intact, no bleeding and no hematoma. TR band applied with 15cc air; patent hemostasis achieved.

## 2021-07-14 NOTE — PROGRESS NOTES
Arrived from home for a CORS.  VSS.  Denies pain.  PIV placed.  H&P current.  Consent obtained.  Ready for procedure.

## 2021-07-15 LAB
ATRIAL RATE - MUSE: 52 BPM
DIASTOLIC BLOOD PRESSURE - MUSE: NORMAL MMHG
INTERPRETATION ECG - MUSE: NORMAL
P AXIS - MUSE: 66 DEGREES
PR INTERVAL - MUSE: 142 MS
QRS DURATION - MUSE: 102 MS
QT - MUSE: 462 MS
QTC - MUSE: 429 MS
R AXIS - MUSE: -16 DEGREES
SYSTOLIC BLOOD PRESSURE - MUSE: NORMAL MMHG
T AXIS - MUSE: -20 DEGREES
VENTRICULAR RATE- MUSE: 52 BPM

## 2021-07-15 NOTE — PLAN OF CARE
D/I/A:  Patient is tolerating liquids and foods, ambulating, urinating, puncture sites are stable ( no bleeding and no hematoma) and patient has a .    A+O x4 and making needs known.  CCL access sites C/D/I; no bleeding or hematoma; CMS intact.  VSSA.  Sinus bradycardia on monitor.  IV access removed.  Education completed and outlined in AVS or handout: medications reviewed with patient.  Questions answered prior to discharge.  Belongings returned to patient at discharge.      P: Discharged to self care.  Patient to follow up with appts as per discharge instruction.

## 2021-07-25 LAB
ACT BLD: 199 SECONDS (ref 74–150)
ACT BLD: 243 SECONDS (ref 74–150)

## 2021-08-05 NOTE — PROGRESS NOTES
"CARDIOLOGY CLINIC INITIAL CONSULTATION    HPI:  Sergio Davis is a 65 year old male who receives primary care fr Dr. Willard Guzman.  He was referred to Cardiology clinic after an abnormal stress test on 2021. He returns to clinic after coronary angiogram.    21  Sergio is a pleasant man with no clinical history of atherosclerotic CVD. CVD risk factors include HTN well-controlled without medications, HLD, family history of CAD.  Over the past year he has noticed mild progression of dyspnea on exertion.  He reports shortness of breath after 1 flight of stairs.  He also feels more fatigued than usual at the end of the day.  No exertional or rest chest pain.  No palpitations, orthopnea, PND, LE edema.    Dr. Guzman ordered an exercise stress echo which showed normal resting LV function but a reduction in LV function at peak exercise with anterior wall motion abnormalities.  Findings are concerning for LAD stenosis.      Sergio's father  age 61 of a \"collapsed artery\" in his heart.  Sergio is a never smoker.    We ordered a coronary angiogram to further evaluate    21 Interval history  Johny reports that his symptoms of dyspnea going up stairs has not changed.  However, this week he went on a 6-7 mile bike ride and was not limited by chest pain or dyspnea.  His coronary angiogram showed moderate proximal LAD stenosis that was not flow-limiting.  The wall motion abnormalities on the stress test may have been a false positive result.  No orthopnea, PND, LE edema.    Home BP 120s/70s    ASSESSMENT AND PLAN  Sergio Davis is a 65 year old male with HTN, HLD and family history of CAD.  With history of progressive dyspnea on exertion he underwent exercise stress echo which showed anterior wall motion abnormalities.  Subsequent coronary angiogram showed moderate proximal LAD stenosis that was not flow-limiting.  Stress test may represent a false positive result.  Other uncommon potential explanations " include nonischemic diseases like sarcoidosis or amyloidosis.  However, we would expect resting wall motion abnormalities with these conditions rather than wall motion abnormalities that manifest with exertion.      At this point we will focus on lowering risk for CVD events and slowing progression of atherosclerotic disease.  We will increase statin to high intensity and continue aspirin and metoprolol.  Blood pressure is adequately controlled.  We discussed ways to improve diet using the Mediterranean style.  Given the ongoing dyspnea I will make referral to pulmonary rehab as well.    Echo showed mildly dilated ascending aorta, 4.3 cm.  We will repeat echo in 1 year.    Plan:  -Increase atorvastatin to 20 mg daily for 1 month.  If there is no progressive muscle pain, increase to 40 mg daily.  -Repeat lipid panel in 3 months  -Pulmonary rehab referral  -Echo in 1 year  -Follow-up with me in 1 year    Thank you for the opportunity to participate in the care of Mr. Sergio Davis. Please feel free to contact me with any additional questions or concerns.    Rell Mireles MD    Preventive Cardiology  Pager: [phone]    PAST MEDICAL HISTORY:  Patient Active Problem List   Diagnosis     S/P complete repair of rotator cuff     Hyperlipidemia LDL goal <100     BMI 33.0-33.9,adult     Cataract of left eye     Vaccine refused by patient     Tinea cruris     Family history of ischemic heart disease     Abnormal stress test     Status post coronary angiogram     Thoracic aortic aneurysm without rupture (H)     Coronary artery disease involving native coronary artery of native heart without angina pectoris     Past Medical History:   Diagnosis Date     BMI 33.0-33.9,adult 10/24/2016     Rotator cuff tear        CURRENT MEDICATIONS:  Current Outpatient Medications   Medication Sig Dispense Refill     aspirin (ASA) 81 MG chewable tablet Take 81 mg by mouth daily       atorvastatin (LIPITOR) 20 MG  "tablet Take 1 tablet (20 mg) by mouth daily 90 tablet 3     metoprolol succinate ER (TOPROL-XL) 25 MG 24 hr tablet Take 1 tablet (25 mg) by mouth daily 30 tablet 3     ORDER FOR [facility] Respironics REMSTAR 60 Series Auto CPAP 6cm H2O, [place] Fx wide nasal mask w/chinstrap         PAST SURGICAL HISTORY:  Past Surgical History:   Procedure Laterality Date     ARTHROSCOPY SHOULDER ROTATOR CUFF REPAIR      10/25/10     CV CORONARY ANGIOGRAM N/A 7/14/2021    Procedure: CV CORONARY ANGIOGRAM;  Surgeon: Jared Ellison MD;  [facility]     CV INSTANTANEOUS WAVE-FREE RATIO N/A 7/14/2021    Procedure: Instantaneous Wave-Free Ratio;  Surgeon: Jared Ellison MD;  [facility]     CV LEFT HEART CATH N/A 7/14/2021    Procedure: Left Heart Cath;  Surgeon: Jared Ellison MD;  Location: [facility]       ALLERGIES  Patient has no known allergies.    FAMILY HX:  Family History   Problem Relation Age of Onset     Myocardial Infarction Father 61     Cancer Sister         Hodgekin's Lymphoma     Diabetes No family hx of      Hypertension No family hx of      Cerebrovascular Disease No family hx of      C.A.D. No family hx of      Heart Failure No family hx of        SOCIAL HX:  Social History     Tobacco Use     Smoking status: Never Smoker     Smokeless tobacco: Never Used     Tobacco comment: no second hand smoke   Substance Use Topics     Alcohol use: Yes     Comment: \"very little\"     Drug use: No        ROS:  Comprehensive ROS is negative except as noted in HPI.    VITAL SIGNS:  /80 (BP Location: Right arm, Patient Position: Chair, Cuff Size: Adult Large)   Pulse 62   Ht 1.93 m (6' 4\")   SpO2 99%   BMI 32.87 kg/m    Body mass index is 32.87 kg/m .  Wt Readings from Last 2 Encounters:   07/14/21 122.5 kg (270 lb)   06/18/21 124.4 kg (274 lb 4.8 oz)       PHYSICAL EXAM  Gen: pleasant man sitting comfortably in NAD  Head: nc/at  Eyes: EOMI  ENT:  trachea " is midline  CV: nml s1/s2, no murmur or gallop; no JVD  Chest: clear lungs  Abd: soft, NT, NABS  Ext: warm, no LE edema  Skin: no rash on limited exam  Neuro: awake, alert, oriented, nml speech and gait  Vasc: 2+ bilateral carotid, brachial, radial, DP and PT pulses; no bruits noted    LABS: personally reviewed  CMP  Recent Labs   Lab Test 21  1308 21  0940 19  1844 18  0843 10/20/16  1029    142 142 140 139   POTASSIUM 4.2 3.9 4.1 4.2 4.3   CHLORIDE 106 107 107 106 104   CO2 28 26 26 28 30   ANIONGAP 3 9 9 6 5   GLC 93 94 87 95 92   BUN 18 13 16 15 14   CR 1.11 0.92 1.02 1.03 1.02   GFRESTIMATED 69 86 77 73 74   GFRESTBLACK  --  >90 89 88 90   VNAESSA 9.7 9.3 8.9 8.9 9.3   PROTTOTAL  --  7.9  --  7.5 7.5   ALBUMIN  --  4.3  --  4.1 4.1   BILITOTAL  --  0.4  --  0.4 0.3   ALKPHOS  --  56  --  55 58   AST  --  16  --  17 17   ALT  --  11  --  12 13     CBC  Recent Labs   Lab Test 21  1308 10/20/16  1029   WBC 5.2 4.7   RBC 4.71 4.57   HGB 13.7 13.5   HCT 41.6 40.6   MCV 88 89   MCH 29.1 29.5   MCHC 32.9 33.3   RDW 13.7 14.0    226     INR  Recent Labs   Lab Test 10/20/16  1029   INR 0.92     Recent Labs   Lab Test 21  0940 19  1844 18  0843   CHOL 217*  --  209*   HDL 44  --  50   * 119* 144*   TRIG 256*  --  77     No lab results found.    Most recent EK2021: NSR, frequent PACs, no ischemic changes    Most recent ECHO: See below    Most recent STRESS TEST:    21 Exercise stress echo  Abnormal exercise echocardiogram with large areas of ischemia in the anterior and anterolateral segments. Patient was asymptomatic throughout the test and LV function returned to normal at the end of the study. Dr. Guzman was updated and patient was discharged with recommendation for cardiology consultation.     The target heart rate was achieved. Normal heart rate and BP response to exercise.  Normal biventricular size, thickness, and global systolic function  at  baseline, LVEF=60-65%.  With exercise, LVEF decreased to 50-55% and LV cavity size did not decrease appropriately.  Mid to distal anterior and anteroseptal and apical hypokinesis at peak  exercise. Concern for proximal LAD or multivessel CAD.  No angina was elicited.  No ECG evidence of ischemia.  Functional capacity is normal for age.  No significant valvular abnormalities are noted on screening Doppler exam.  Ascending aorta is dilated 4.3cm.    Most recent CARDIAC CATH: Reviewed and discussed with patient  7/14/21    Left ventricular filling pressures were normal.    Single vessel CAD    >>> Normal RCA    >>> Normal LMCA & LCx    >>> LAD: 40% stenosis mid vessel at site of D2 takeoff (40%)    Physiologically insignificant mid LAD (dPA 0.90; FFR 0.88) and D2 (dPA 0.92)

## 2021-08-06 ENCOUNTER — OFFICE VISIT (OUTPATIENT)
Dept: CARDIOLOGY | Facility: CLINIC | Age: 66
End: 2021-08-06
Attending: INTERNAL MEDICINE
Payer: COMMERCIAL

## 2021-08-06 VITALS
HEIGHT: 76 IN | HEART RATE: 62 BPM | BODY MASS INDEX: 32.87 KG/M2 | SYSTOLIC BLOOD PRESSURE: 139 MMHG | DIASTOLIC BLOOD PRESSURE: 80 MMHG | OXYGEN SATURATION: 99 %

## 2021-08-06 DIAGNOSIS — R06.09 DYSPNEA ON EXERTION: Primary | ICD-10-CM

## 2021-08-06 DIAGNOSIS — I71.20 THORACIC AORTIC ANEURYSM WITHOUT RUPTURE (H): ICD-10-CM

## 2021-08-06 DIAGNOSIS — I25.10 CORONARY ARTERY DISEASE INVOLVING NATIVE CORONARY ARTERY OF NATIVE HEART WITHOUT ANGINA PECTORIS: ICD-10-CM

## 2021-08-06 DIAGNOSIS — E78.5 HYPERLIPIDEMIA LDL GOAL <70: ICD-10-CM

## 2021-08-06 PROCEDURE — 99214 OFFICE O/P EST MOD 30 MIN: CPT | Performed by: INTERNAL MEDICINE

## 2021-08-06 PROCEDURE — G0463 HOSPITAL OUTPT CLINIC VISIT: HCPCS

## 2021-08-06 RX ORDER — ATORVASTATIN CALCIUM 20 MG/1
20 TABLET, FILM COATED ORAL DAILY
Qty: 90 TABLET | Refills: 3 | Status: SHIPPED | OUTPATIENT
Start: 2021-08-06 | End: 2022-06-17

## 2021-08-06 ASSESSMENT — PAIN SCALES - GENERAL: PAINLEVEL: NO PAIN (0)

## 2021-08-06 NOTE — NURSING NOTE
Chief Complaint   Patient presents with     Follow Up     Rtn Visit      Vitals were taken and medications were reconciled.     Kelley Condon RMA  8:08 AM

## 2021-08-06 NOTE — PATIENT INSTRUCTIONS
"You were seen today in the Cardiovascular Clinic at the [facility].     Cardiology Providers you saw during your visit: Dr. Rell Mireles    Diagnosis:   Encounter Diagnoses   Name Primary?     Hyperlipidemia LDL goal <100      Dyspnea on exertion Yes     Thoracic aortic aneurysm without rupture (H)      Coronary artery disease involving native coronary artery of native heart without angina pectoris           Orders:   Orders Placed This Encounter   Procedures     Lipid panel reflex to direct LDL Fasting     Pulmonary Rehab Referral     Follow-Up with Cardiologist     Echocardiogram Complete       Recommendations:   1. Increase atorvastatin to 20mg daily for 1 month. If you do not have any worsening muscle pain, increase to 40mg daily. Send me a message when you do and I can change your prescription to 40mg tablets.   2. Check cholesterol labs in 3 months  3. Schedule an appointment with pulmonary rehab.  4. Schedule an echo in 1 year.  5. Follow up with Dr. lEijah Mireles in 1 year after echo is complete.        Please feel free to call me with any questions or concerns.       Delmi JEFFERY LPN     Questions: [phone]  First press #1 for [employer] for \"Medical Questions\" to reach us Cardiology Nurses.   [phone]   First press #1 for the [employer] and then press #1     On Call Cardiologist for after hours or on weekends: [phone]   option #4 and ask to speak to the on-call Cardiologist.          If you need a medication refill please contact your pharmacy.  Please allow 3 business days for your refill to be completed.  --------------------------------------------------------------     Patient Education     Mediterranean Diet  A heart healthy eating plan  The Mediterranean Diet is based on the eating habits of people in countries near the [place]. People living in this part of the world have long lives and low rates of chronic diseases. They have lower rates of death " from heart disease, cancer and other illnesses.  When you follow this eating plan you'll have better control of your blood sugar and weight. The plan requires simple changes in your habits of eating, and the whole family can take part.  Mediterranean lifestyle   Enjoy eating with others. Sit at the table with family and friends and enjoy your meal. When you eat slowly, you are able to tune in to your body's hunger and fullness signals. You're less likely to overeat.  Be physically active: Being active every day is important for overall good health. Run, walk, dance and do lighter activities such as house and yard work. Move more and sit less!  Drink plenty of water during the day.  Drink red wine with meals in modest amounts (optional).  The Mediterranean Pyramid   The pyramid shows the food groups and the amounts eaten in relation to the whole diet. It is more than a diet; it is also a life-style plan.  The largest food group at bottom contains plant foods: vegetables, fruits, grains, nuts, legumes, seeds, olives and olive oil. These foods make up the largest part of your meals.   The next groups above: fish and seafood, then poultry, cheese, eggs and yogurt are eaten less often and in smaller servings.   The top group, meats and sweets, are eaten the least often and in the smallest amounts.    Tips for adding plant foods to your meals   Vegetables and fruits:     Aim for 3 to 8 servings each day. A serving is   to 2 cups, depending on the food.    Choose a variety of colors. Big green salads are a great way to include several vegetable servings.    Try fresh fruit as a dessert: oranges, grapes, apples pomegranates or fresh figs.    At home keep fresh fruit in a bowl to tempt family.    Bring fruit and vegetables to work for a snack.  Oil     Replace butter and margarine with healthy fats such as olive oil. Other plant-based oils are canola, walnut and peanut oil. These are high in good fats. Use them in cooking,  "salad dressings and baking.    Drizzle your bread with olive oil instead of butter or margarine. Use herbs and spices to add flavor and aroma and to reduce fat and salt when cooking.  Whole grains    Look for the term \"whole\" or \"whole grain\" on the package. Processing grains removes vitamins, minerals and fiber. Whole grains may include: corn, wheat, oats, rye, rice and barley.    Examples of Mediterranean grains include: barley, farro, buckwheat, bulgur, couscous, and wheatberries.    Slowly switch to a whole grain by using whole-grain blends of pastas and rice. Or mix whole grains with refined; for example, mix whole-wheat pasta with white pasta.  Beans and legumes     Beans are a good source of protein and fiber, adding flavor and texture to dishes. Examples include cannellini beans, chickpea, amy beans, green beans, kidney beans, lentils and split peas.    Cook a vegetarian meal one night a week: use beans or legumes with vegetables and grains.    Nuts are high in healthy fats. Try walnuts, almonds, pine nuts, hazelnuts and cashews. Avoid candied, honey-roasted and heavily salted nuts.    Limit your intake of nuts to a small handful each day. Explore ways to add to nuts to salads and other dishes.  Tips for using fish and seafood in your meals    Aim for meals with fish or shellfish at least twice a week.    Tuna, herring, salmon and sardines are rich in heart-healthy omega-3. Shellfish, such as mussels, oysters and clams, have similar benefits for brain and heart health.  Tips for using poultry, eggs and cheese and yogurt in your meals    Eat poultry or eggs at least twice a week.    Roast, broil or grill your poultry. Season with fresh or dried herbs.    Enjoy low-fat cheese or yogurt every day.  Tips for using red meat and sweets in your meals     Limit lean red meat to one time a week or 4 times a month.    Red meat has more saturated fats. Choose a lean cut, like top loin, sirloin, flank steak, strip " steak or 90% lean ground beef.    Limit portions to 3 to 4 ounces.    Make whole grains and vegetables the main focus of a meal. Add meat in small amounts for flavor.    Limit sweets such as ice cream or cookies for a special times or holidays.  Snacks    Snack on a handful of almonds, walnuts or sunflower seeds in place of chips, cookies or other processed snack foods.    Calcium-rich, low-fat cheese or low- and nonfat plain yogurt with fresh fruit are healthy snacks that are easy to take with you.  Like to know more?  For tips on shoppping, cooking and eating well the Mediterranean way, go to the [employer] website at www.Perdoo.org.  For informational purposes only. Not to replace the advice of your health care provider.   Copyright   2014 [facility]. All rights reserved.   Mediterranean pyramrid used with permission of the [employer] 120574 - 09/15.

## 2021-08-06 NOTE — LETTER
"2021      RE: Sergio Davis  [address]  [place] [address]       Dear Colleague,    Thank you for the opportunity to participate in the care of your patient, Sergio Davis, at the [facility] [place] [facility]. Please see a copy of my visit note below.    CARDIOLOGY CLINIC INITIAL CONSULTATION    HPI:  Sergio Davis is a 65 year old male who receives primary care fr Dr. Willard Guzman.  He was referred to Cardiology clinic after an abnormal stress test on 2021. He returns to clinic after coronary angiogram.    21  Sergio is a pleasant man with no clinical history of atherosclerotic CVD. CVD risk factors include HTN well-controlled without medications, HLD, family history of CAD.  Over the past year he has noticed mild progression of dyspnea on exertion.  He reports shortness of breath after 1 flight of stairs.  He also feels more fatigued than usual at the end of the day.  No exertional or rest chest pain.  No palpitations, orthopnea, PND, LE edema.    Dr. Guzman ordered an exercise stress echo which showed normal resting LV function but a reduction in LV function at peak exercise with anterior wall motion abnormalities.  Findings are concerning for LAD stenosis.      Sergio's father  age 61 of a \"collapsed artery\" in his heart.  Sergio is a never smoker.    We ordered a coronary angiogram to further evaluate    21 Interval history  Johny reports that his symptoms of dyspnea going up stairs has not changed.  However, this week he went on a 6-7 mile bike ride and was not limited by chest pain or dyspnea.  His coronary angiogram showed moderate proximal LAD stenosis that was not flow-limiting.  The wall motion abnormalities on the stress test may have been a false positive result.  No orthopnea, PND, LE edema.    Home BP 120s/70s    ASSESSMENT AND PLAN  Sergio Davis is a 65 year old male with HTN, HLD " and family history of CAD.  With history of progressive dyspnea on exertion he underwent exercise stress echo which showed anterior wall motion abnormalities.  Subsequent coronary angiogram showed moderate proximal LAD stenosis that was not flow-limiting.  Stress test may represent a false positive result.  Other uncommon potential explanations include nonischemic diseases like sarcoidosis or amyloidosis.  However, we would expect resting wall motion abnormalities with these conditions rather than wall motion abnormalities that manifest with exertion.      At this point we will focus on lowering risk for CVD events and slowing progression of atherosclerotic disease.  We will increase statin to high intensity and continue aspirin and metoprolol.  Blood pressure is adequately controlled.  We discussed ways to improve diet using the Mediterranean style.  Given the ongoing dyspnea I will make referral to pulmonary rehab as well.    Echo showed mildly dilated ascending aorta, 4.3 cm.  We will repeat echo in 1 year.    Plan:  -Increase atorvastatin to 20 mg daily for 1 month.  If there is no progressive muscle pain, increase to 40 mg daily.  -Repeat lipid panel in 3 months  -Pulmonary rehab referral  -Echo in 1 year  -Follow-up with me in 1 year    Thank you for the opportunity to participate in the care of Mr. Sergio Davis. Please feel free to contact me with any additional questions or concerns.    Rell Mireles MD    Preventive Cardiology  Pager: [phone]    PAST MEDICAL HISTORY:  Patient Active Problem List   Diagnosis     S/P complete repair of rotator cuff     Hyperlipidemia LDL goal <100     BMI 33.0-33.9,adult     Cataract of left eye     Vaccine refused by patient     Tinea cruris     Family history of ischemic heart disease     Abnormal stress test     Status post coronary angiogram     Thoracic aortic aneurysm without rupture (H)     Coronary artery disease involving native coronary  "artery of native heart without angina pectoris     Past Medical History:   Diagnosis Date     BMI 33.0-33.9,adult 10/24/2016     Rotator cuff tear        CURRENT MEDICATIONS:  Current Outpatient Medications   Medication Sig Dispense Refill     aspirin (ASA) 81 MG chewable tablet Take 81 mg by mouth daily       atorvastatin (LIPITOR) 20 MG tablet Take 1 tablet (20 mg) by mouth daily 90 tablet 3     metoprolol succinate ER (TOPROL-XL) 25 MG 24 hr tablet Take 1 tablet (25 mg) by mouth daily 30 tablet 3     [place] [facility] REMSTAR 60 Series Auto CPAP 6cm H2O, [place] Fx wide nasal mask w/chinstrap         PAST SURGICAL HISTORY:  Past Surgical History:   Procedure Laterality Date     ARTHROSCOPY SHOULDER ROTATOR CUFF REPAIR      10/25/10     CV CORONARY ANGIOGRAM N/A 7/14/2021    Procedure: CV CORONARY ANGIOGRAM;  Surgeon: Jared Ellison MD;  Location:  HEART CARDIAC CATH LAB     CV INSTANTANEOUS WAVE-FREE RATIO N/A 7/14/2021    Procedure: Instantaneous Wave-Free Ratio;  Surgeon: Jared Ellison MD;  Location: [facility]     CV LEFT HEART CATH N/A 7/14/2021    Procedure: Left Heart Cath;  Surgeon: Jared Ellison MD;  Location: [facility]ARD[facility]       ALLERGIES  Patient has no known allergies.    FAMILY HX:  Family History   Problem Relation Age of Onset     Myocardial Infarction Father 61     Cancer Sister         Hodgekin's Lymphoma     Diabetes No family hx of      Hypertension No family hx of      Cerebrovascular Disease No family hx of      C.A.D. No family hx of      Heart Failure No family hx of        SOCIAL HX:  Social History     Tobacco Use     Smoking status: Never Smoker     Smokeless tobacco: Never Used     Tobacco comment: no second hand smoke   Substance Use Topics     Alcohol use: Yes     Comment: \"very little\"     Drug use: No        ROS:  Comprehensive ROS is negative except as noted in HPI.    VITAL SIGNS:  /80 (BP Location: Right arm, Patient " "Position: Chair, Cuff Size: Adult Large)   Pulse 62   Ht 1.93 m (6' 4\")   SpO2 99%   BMI 32.87 kg/m    Body mass index is 32.87 kg/m .  Wt Readings from Last 2 Encounters:   21 122.5 kg (270 lb)   21 124.4 kg (274 lb 4.8 oz)       PHYSICAL EXAM  Gen: pleasant man sitting comfortably in NAD  Head: nc/at  Eyes: EOMI  ENT:  trachea is midline  CV: nml s1/s2, no murmur or gallop; no JVD  Chest: clear lungs  Abd: soft, NT, NABS  Ext: warm, no LE edema  Skin: no rash on limited exam  Neuro: awake, alert, oriented, nml speech and gait  Vasc: 2+ bilateral carotid, brachial, radial, DP and PT pulses; no bruits noted    LABS: personally reviewed  CMP  Recent Labs   Lab Test 21  1308 21  0940 19  1844 18  0843 10/20/16  1029    142 142 140 139   POTASSIUM 4.2 3.9 4.1 4.2 4.3   CHLORIDE 106 107 107 106 104   CO2 28 26 26 28 30   ANIONGAP 3 9 9 6 5   GLC 93 94 87 95 92   BUN 18 13 16 15 14   CR 1.11 0.92 1.02 1.03 1.02   GFRESTIMATED 69 86 77 73 74   GFRESTBLACK  --  >90 89 88 90   VANESSA 9.7 9.3 8.9 8.9 9.3   PROTTOTAL  --  7.9  --  7.5 7.5   ALBUMIN  --  4.3  --  4.1 4.1   BILITOTAL  --  0.4  --  0.4 0.3   ALKPHOS  --  56  --  55 58   AST  --  16  --  17 17   ALT  --  11  --  12 13     CBC  Recent Labs   Lab Test 21  1308 10/20/16  1029   WBC 5.2 4.7   RBC 4.71 4.57   HGB 13.7 13.5   HCT 41.6 40.6   MCV 88 89   MCH 29.1 29.5   MCHC 32.9 33.3   RDW 13.7 14.0    226     INR  Recent Labs   Lab Test 10/20/16  1029   INR 0.92     Recent Labs   Lab Test 21  0940 19  1844 18  0843   CHOL 217*  --  209*   HDL 44  --  50   * 119* 144*   TRIG 256*  --  77     No lab results found.    Most recent EK2021: NSR, frequent PACs, no ischemic changes    Most recent ECHO: See below    Most recent STRESS TEST:    21 Exercise stress echo  Abnormal exercise echocardiogram with large areas of ischemia in the anterior and anterolateral segments. Patient " was asymptomatic throughout the test and LV function returned to normal at the end of the study. Dr. Guzman was updated and patient was discharged with recommendation for cardiology consultation.     The target heart rate was achieved. Normal heart rate and BP response to exercise.  Normal biventricular size, thickness, and global systolic function at  baseline, LVEF=60-65%.  With exercise, LVEF decreased to 50-55% and LV cavity size did not decrease appropriately.  Mid to distal anterior and anteroseptal and apical hypokinesis at peak  exercise. Concern for proximal LAD or multivessel CAD.  No angina was elicited.  No ECG evidence of ischemia.  Functional capacity is normal for age.  No significant valvular abnormalities are noted on screening Doppler exam.  Ascending aorta is dilated 4.3cm.    Most recent CARDIAC CATH: Reviewed and discussed with patient  7/14/21    Left ventricular filling pressures were normal.    Single vessel CAD    >>> Normal RCA    >>> Normal LMCA & LCx    >>> LAD: 40% stenosis mid vessel at site of D2 takeoff (40%)    Physiologically insignificant mid LAD (dPA 0.90; FFR 0.88) and D2 (dPA 0.92)      Please do not hesitate to contact me if you have any questions/concerns.     Sincerely,     Rell Mireles MD

## 2021-09-05 ENCOUNTER — HEALTH MAINTENANCE LETTER (OUTPATIENT)
Age: 66
End: 2021-09-05

## 2021-11-01 DIAGNOSIS — R94.39 ABNORMAL STRESS TEST: ICD-10-CM

## 2021-11-01 RX ORDER — METOPROLOL SUCCINATE 25 MG/1
25 TABLET, EXTENDED RELEASE ORAL DAILY
Qty: 30 TABLET | Refills: 3 | Status: SHIPPED | OUTPATIENT
Start: 2021-11-01 | End: 2022-03-01

## 2021-11-19 ENCOUNTER — TELEPHONE (OUTPATIENT)
Dept: SLEEP MEDICINE | Facility: CLINIC | Age: 66
End: 2021-11-19
Payer: COMMERCIAL

## 2021-11-19 NOTE — TELEPHONE ENCOUNTER
Reason for call:  Other   Patient called regarding (reason for call): cpap recall   Additional comments: Patient request call from nurse regarding Cpap recall and pt would like to know when he can go to  for pap download     Phone number to reach patient:  Cell number on file:    Telephone Information:   Mobile [phone]       Best Time:  any    Can we leave a detailed message on this number?  YES    Travel screening: Not Applicable

## 2021-11-23 NOTE — TELEPHONE ENCOUNTER
Patient call regarding [employer] recall for their pap device.    Device type:: Auto CPAP    Supplemental oxygen: No , if yes moved to advanced device workflow.     Current durable medical equipment provider: [facility]     Age of your current device:  greater than 5 years old    History review:     Does the patient have the following?      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 No       Discontinuation of PAP therapy would lead to substantial deterioration of functional status or quality of life. Yes    If yes to any of the questions      Advised patient to continue using therapy until device is replaced or repaired.    Advised patient to avoid unapproved cleaning methods, such as ozone (see FDA safety communication on use of ozone ).    Has patient registered device? Yes Advised patient to register for repair or replacement on the [employer] website.  Patient can call [employer] at [phone] for additional support.    Bacterial filters to reduce exposure to particulates are sometimes cumbersome to use and are not currently recommended by the .If you choose to use a bacterial filter, consider discontinuing using humidity with the filter.     Does the patient feel they still need to have further discussion with provider ?   No  He does not want to schedule a visit at this time he feels he is doing very well.      Please contact your DME to see if you are eligible to receive a new device if it is over 5 years old.  You may also choose to pay out  of pocket for a new device, if your insurance does not cover a new device at this time.           Plan :     Patient wishes to continue therapy. Recommended  to continue use of therapy until it is repaired or replaced.

## 2022-02-24 DIAGNOSIS — R94.39 ABNORMAL STRESS TEST: ICD-10-CM

## 2022-03-01 NOTE — TELEPHONE ENCOUNTER
metoprolol succinate ER (TOPROL-XL) 25 MG 24 hr tablet   Last Written Prescription Date: 11/1/2021  Last Fill Quantity: 30,   # refills: 3  Last Office Visit :  8/6/2021  Future Office visit:   None    Routing refill request to provider for review/approval because:  Only a 90 day supply sent to pharm last order due to a abnormal stress test.    Would Provider like a 90 day supply with refills sent to pharm for Pt care.  Please advise     Pam Guzman RN  Central Triage Red Flags/Med Refills

## 2022-03-02 RX ORDER — METOPROLOL SUCCINATE 25 MG/1
25 TABLET, EXTENDED RELEASE ORAL DAILY
Qty: 90 TABLET | Refills: 3 | Status: SHIPPED | OUTPATIENT
Start: 2022-03-02 | End: 2023-03-24

## 2022-03-06 ENCOUNTER — TELEPHONE (OUTPATIENT)
Dept: FAMILY MEDICINE | Facility: CLINIC | Age: 67
End: 2022-03-06
Payer: COMMERCIAL

## 2022-03-06 NOTE — TELEPHONE ENCOUNTER
Reason for call:  Other   Patient called regarding (reason for call): call back  Additional comments: pls call back pt needs meds changed due to increasedd hbp     Phone number to reach patient:  Cell number on file:    Telephone Information:   Mobile [phone]       Best Time:  any    Can we leave a detailed message on this number?  YES    Travel screening: Not Applicable

## 2022-03-07 NOTE — TELEPHONE ENCOUNTER
Attempted to left vm and will send [employer] message.     Thanks,  ZEYNEP Sifuentes  [facility]

## 2022-03-09 NOTE — TELEPHONE ENCOUNTER
Please see mychart messages between pt and PCP.     Thanks,  ZEYNEP Sifuentes  [facility]

## 2022-04-02 ENCOUNTER — TELEPHONE (OUTPATIENT)
Dept: CARDIOLOGY | Facility: CLINIC | Age: 67
End: 2022-04-02
Payer: COMMERCIAL

## 2022-04-29 ENCOUNTER — ALLIED HEALTH/NURSE VISIT (OUTPATIENT)
Dept: FAMILY MEDICINE | Facility: CLINIC | Age: 67
End: 2022-04-29
Payer: COMMERCIAL

## 2022-04-29 VITALS — DIASTOLIC BLOOD PRESSURE: 84 MMHG | HEART RATE: 54 BPM | OXYGEN SATURATION: 100 % | SYSTOLIC BLOOD PRESSURE: 129 MMHG

## 2022-04-29 DIAGNOSIS — I10 ESSENTIAL HYPERTENSION WITH GOAL BLOOD PRESSURE LESS THAN 130/80: ICD-10-CM

## 2022-04-29 DIAGNOSIS — Z01.30 BLOOD PRESSURE CHECK: Primary | ICD-10-CM

## 2022-04-29 PROCEDURE — 99207 PR NO CHARGE NURSE ONLY: CPT

## 2022-04-29 RX ORDER — LISINOPRIL 10 MG/1
10 TABLET ORAL DAILY
Qty: 30 TABLET | Refills: 0 | Status: SHIPPED | OUTPATIENT
Start: 2022-04-29 | End: 2022-06-10

## 2022-04-29 NOTE — TELEPHONE ENCOUNTER
"Sammi, upcoming visit.     Requested Prescriptions   Pending Prescriptions Disp Refills     lisinopril (ZESTRIL) 10 MG tablet [Pharmacy Med Name: Lisinopril Oral Tablet 10 MG] 30 tablet 0     Sig: Take 1 tablet (10 mg) by mouth daily       ACE Inhibitors (Including Combos) Protocol Failed - 4/29/2022  9:54 AM        Failed - Recent (12 mo) or future (30 days) visit within the authorizing provider's specialty     Patient has had an office visit with the authorizing provider or a provider within the authorizing providers department within the previous 12 mos or has a future within next 30 days. See \"Patient Info\" tab in inbasket, or \"Choose Columns\" in Meds & Orders section of the refill encounter.              Passed - Blood pressure under 140/90 in past 12 months     BP Readings from Last 3 Encounters:   04/29/22 129/84   08/06/21 139/80   07/14/21 120/79                 Passed - Medication is active on med list        Passed - Patient is age 18 or older        Passed - Normal serum creatinine on file in past 12 months     Recent Labs   Lab Test 07/14/21  1308   CR 1.11       Ok to refill medication if creatinine is low          Passed - Normal serum potassium on file in past 12 months     Recent Labs   Lab Test 07/14/21  1308   POTASSIUM 4.2                Maria Hutchinson RN  [facility]       " Pharmacy is still waiting on the 90 day Refill for FAMOTIDINE 20 MG TABLET. Please send refill

## 2022-04-29 NOTE — PROGRESS NOTES
I met with Sergio Davis at the request of Megan to recheck his blood pressure.  Blood pressure medications on the med list were reviewed with patient.    Patient has taken all medications as per usual regimen: Yes  Patient reports tolerating them without any issues or concerns: No    Vitals:    04/29/22 0920 04/29/22 0931   BP: (!) 156/84 129/84   Pulse: 54 54   SpO2: 100%        After 5 minutes, the patient's blood pressure was <140/90, the previous encounter was reviewed, recorded blood pressure below 140/90. Patient was discharged and the note will be sent to the provider for final review.

## 2022-06-10 DIAGNOSIS — I10 ESSENTIAL HYPERTENSION WITH GOAL BLOOD PRESSURE LESS THAN 130/80: ICD-10-CM

## 2022-06-10 RX ORDER — LISINOPRIL 10 MG/1
10 TABLET ORAL DAILY
Qty: 30 TABLET | Refills: 0 | Status: SHIPPED | OUTPATIENT
Start: 2022-06-10 | End: 2023-05-14

## 2022-06-10 NOTE — TELEPHONE ENCOUNTER
"Requested Prescriptions   Pending Prescriptions Disp Refills     lisinopril (ZESTRIL) 10 MG tablet [Pharmacy Med Name: Lisinopril Oral Tablet 10 MG] 30 tablet 0     Sig: Take 1 tablet (10 mg) by mouth daily       ACE Inhibitors (Including Combos) Protocol Passed - 6/10/2022  9:01 AM        Passed - Blood pressure under 140/90 in past 12 months     BP Readings from Last 3 Encounters:   04/29/22 129/84   08/06/21 139/80   07/14/21 120/79                 Passed - Recent (12 mo) or future (30 days) visit within the authorizing provider's specialty     Patient has had an office visit with the authorizing provider or a provider within the authorizing providers department within the previous 12 mos or has a future within next 30 days. See \"Patient Info\" tab in inbasket, or \"Choose Columns\" in Meds & Orders section of the refill encounter.              Passed - Medication is active on med list        Passed - Patient is age 18 or older        Passed - Normal serum creatinine on file in past 12 months     Recent Labs   Lab Test 07/14/21  1308   CR 1.11       Ok to refill medication if creatinine is low          Passed - Normal serum potassium on file in past 12 months     Recent Labs   Lab Test 07/14/21  1308   POTASSIUM 4.2                Refilled per protocol.  Irena SILVA RN    "

## 2022-06-12 ENCOUNTER — HEALTH MAINTENANCE LETTER (OUTPATIENT)
Age: 67
End: 2022-06-12

## 2022-06-16 NOTE — PATIENT INSTRUCTIONS
Call cards to schedule annual     GI will call to schedule colon cancer screening    Use oTC medications clotrimazole, miconazole, Lamisil twice daily for 2 weeks then once daily for 4 weeks then once a week as a preventative for rash    Patient Education   Personalized Prevention Plan  You are due for the preventive services outlined below.  Your care team is available to assist you in scheduling these services.  If you have already completed any of these items, please share that information with your care team to update in your medical record.  Health Maintenance Due   Topic Date Due    ANNUAL REVIEW OF HM ORDERS  Never done    COVID-19 Vaccine (1) Never done    Pneumococcal Vaccine (1 - PCV) Never done    Hepatitis C Screening  Never done    Diptheria Tetanus Pertussis (DTAP/TDAP/TD) Vaccine (1 - Tdap) Never done    Zoster (Shingles) Vaccine (1 of 2) Never done    Colorectal Cancer Screening  01/10/2019    PHQ-2 (once per calendar year)  01/01/2022    Annual Wellness Visit  03/31/2022    FALL RISK ASSESSMENT  03/31/2022

## 2022-06-16 NOTE — PROGRESS NOTES
"SUBJECTIVE:   Sergio Davis is a 66 year old male who presents for Preventive Visit.    Patient has been advised of split billing requirements and indicates understanding: Yes  Are you in the first 12 months of your Medicare coverage?  Yes, right eye 20/40 and left eye 10/20 wear corrective lens.    Healthy Habits:     In general, how would you rate your overall health?  Fair    Frequency of exercise:  2-3 days/week    Duration of exercise:  15-30 minutes    Do you usually eat at least 4 servings of fruit and vegetables a day, include whole grains    & fiber and avoid regularly eating high fat or \"junk\" foods?  Yes    Taking medications regularly:  Yes    Medication side effects:  None    Ability to successfully perform activities of daily living:  No assistance needed    Home Safety:  No safety concerns identified    Hearing Impairment:  No hearing concerns    In the past 6 months, have you been bothered by leaking of urine?  No    In general, how would you rate your overall mental or emotional health?  Good      PHQ-2 Total Score: 0    Additional concerns today:  Yes    Do you feel safe in your environment? Yes    Have you ever done Advance Care Planning? (For example, a Health Directive, POLST, or a discussion with a medical provider or your loved ones about your wishes): Yes, advance care planning is on file.     Fall risk  Fallen 2 or more times in the past year?: No  Any fall with injury in the past year?: No    Cognitive Screening   1) Repeat 3 items (Leader, Season, Table)    2) Clock draw: NORMAL  3) 3 item recall: Recalls 3 objects  Results: 3 items recalled: COGNITIVE IMPAIRMENT LESS LIKELY    Mini-CogTM Copyright OSKAR Garcia. Licensed by the author for use in [facility]; reprinted with permission (zan@.[email or web address]). All rights reserved.      Do you have sleep apnea, excessive snoring or daytime drowsiness?: yes    Reviewed and updated as needed this visit by clinical staff                " "    Reviewed and updated as needed this visit by Provider                   Social History     Tobacco Use     Smoking status: Never Smoker     Smokeless tobacco: Never Used     Tobacco comment: no second hand smoke   Substance Use Topics     Alcohol use: Yes     Comment: \"very little\"     If you drink alcohol do you typically have >3 drinks per day or >7 drinks per week? No    Alcohol Use 3/13/2013   Prescreen: >3 drinks/day or >7 drinks/week? The patient does not drink >3 drinks per day nor >7 drinks per week.     Joint Pain    Onset: 1 week ago    Description:   Location: left foot/great toe  Character: Sharp and Dull ache    Intensity: mild    Progression of Symptoms: intermittent    Accompanying Signs & Symptoms:  Other symptoms: none    History:   Previous similar pain: no       Precipitating factors:   Trauma or overuse: YES    Alleviating factors:  Improved by: rest/inactivity    Therapies Tried and outcome: ibuprofen, ice helps some    Hyperlipidemia Follow-Up      Are you regularly taking any medication or supplement to lower your cholesterol?   Yes- Atorvastatin    Are you having muscle aches or other side effects that you think could be caused by your cholesterol lowering medication?  No    Hypertension Follow-up      Do you check your blood pressure regularly outside of the clinic? No     Are you following a low salt diet? No    Are your blood pressures ever more than 140 on the top number (systolic) OR more   than 90 on the bottom number (diastolic), for example 140/90? Yes    Vascular Disease Follow-up      How often do you take nitroglycerin? Never    Do you take an aspirin every day? Yes      Current providers sharing in care for this patient include:   Patient Care Team:  Willard Guzman MD as PCP - General (Family Medicine)  Willard Guzman MD as Assigned PCP  Rell Owen MD as Assigned Heart and Vascular Provider    The following health maintenance items are reviewed in Epic " "and correct as of today:  Health Maintenance Due   Topic Date Due     ANNUAL REVIEW OF HM ORDERS  Never done     COVID-19 Vaccine (1) Never done     Pneumococcal Vaccine: 65+ Years (1 - PCV) Never done     HEPATITIS C SCREENING  Never done     DTAP/TDAP/TD IMMUNIZATION (1 - Tdap) Never done     ZOSTER IMMUNIZATION (1 of 2) Never done     COLORECTAL CANCER SCREENING  01/10/2019     PHQ-2 (once per calendar year)  01/01/2022     MEDICARE ANNUAL WELLNESS VISIT  03/31/2022     FALL RISK ASSESSMENT  03/31/2022     Lab work is in process  Labs reviewed in EPIC  Declines all vaccines    Review of Systems   Constitutional: Negative for chills and fever.   HENT: Negative for congestion, ear pain, hearing loss and sore throat.    Eyes: Negative for pain and visual disturbance.   Respiratory: Positive for shortness of breath. Negative for cough.    Cardiovascular: Negative for chest pain, palpitations and peripheral edema.   Gastrointestinal: Negative for abdominal pain, constipation, diarrhea, heartburn, hematochezia and nausea.   Genitourinary: Negative for dysuria, frequency, genital sores, hematuria, impotence, penile discharge and urgency.   Musculoskeletal: Positive for joint swelling. Negative for arthralgias and myalgias.   Skin: Negative for rash.   Neurological: Negative for dizziness, weakness, headaches and paresthesias.   Psychiatric/Behavioral: Negative for mood changes. The patient is not nervous/anxious.      Constitutional, HEENT, cardiovascular, pulmonary, GI, , musculoskeletal, neuro, skin, endocrine and psych systems are negative, except as otherwise noted.    OBJECTIVE:   There were no vitals taken for this visit. Estimated body mass index is 32.87 kg/m  as calculated from the following:    Height as of 8/6/21: 1.93 m (6' 4\").    Weight as of 7/14/21: 122.5 kg (270 lb).  Physical Exam  GENERAL: healthy, alert and no distress  EYES: Eyes grossly normal to inspection, PERRL and conjunctivae and sclerae " normal  HENT: ear canals and TM's normal, nose and mouth without ulcers or lesions  NECK: no adenopathy, no asymmetry, masses, or scars and thyroid normal to palpation  RESP: lungs clear to auscultation - no rales, rhonchi or wheezes  CV: regular rate and rhythm, normal S1 S2, no S3 or S4, no murmur, click or rub, no peripheral edema and peripheral pulses strong  ABDOMEN: soft, nontender, no hepatosplenomegaly, no masses and bowel sounds normal   (male): normal male genitalia without lesions or urethral discharge, no hernia  RECTAL: normal sphincter tone, no rectal masses, prostate normal size, smooth, nontender without nodules or masses  MS: no gross musculoskeletal defects noted, no edema  SKIN: maculopapular eruption - buttocks  NEURO: Normal strength and tone, mentation intact and speech normal  PSYCH: mentation appears normal, affect normal/bright    Diagnostic Test Results:  Labs reviewed in Epic    ASSESSMENT / PLAN:       ICD-10-CM    1. Welcome to Medicare preventive visit  Z00.00    2. Pain of toe of left foot  M79.675 Uric acid     OFFICE/OUTPT VISIT,EST,LEVL III     OFFICE/OUTPT VISIT,EST,LEVL II     CANCELED: OFFICE/OUTPT VISIT,EST,LEVL II     CANCELED: OFFICE/OUTPT VISIT,EST,LEVL II   3. Tinea cruris  B35.6 OFFICE/OUTPT VISIT,EST,LEVL III     OFFICE/OUTPT VISIT,EST,LEVL II     CANCELED: OFFICE/OUTPT VISIT,EST,LEVL II   4. Coronary artery disease involving native coronary artery of native heart without angina pectoris  I25.10    5. Thoracic aortic aneurysm without rupture (H)  I71.2    6. Elevated blood pressure reading with diagnosis of hypertension  I10 lisinopril (ZESTRIL) 20 MG tablet     Comprehensive metabolic panel (BMP + Alb, Alk Phos, ALT, AST, Total. Bili, TP)     OFFICE/OUTPT VISIT,EST,LEVL III   7. Hyperlipidemia LDL goal <70  E78.5 atorvastatin (LIPITOR) 20 MG tablet   8. BMI 33.0-33.9,adult  Z68.33    9. Screen for colon cancer  Z12.11 Fecal colorectal cancer screen FIT - Future (S+30)  "  10. Screening for prostate cancer  Z12.5 PSA, screen       Patient has been advised of split billing requirements and indicates understanding: Yes    COUNSELING:  Reviewed preventive health counseling, as reflected in patient instructions       Regular exercise       Healthy diet/nutrition       Vision screening       Colon cancer screening       Prostate cancer screening    Estimated body mass index is 32.87 kg/m  as calculated from the following:    Height as of 8/6/21: 1.93 m (6' 4\").    Weight as of 7/14/21: 122.5 kg (270 lb).    Weight management plan: Discussed healthy diet and exercise guidelines    He reports that he has never smoked. He has never used smokeless tobacco.      Appropriate preventive services were discussed with this patient, including applicable screening as appropriate for cardiovascular disease, diabetes, osteopenia/osteoporosis, and glaucoma.  As appropriate for age/gender, discussed screening for colorectal cancer, prostate cancer, breast cancer, and cervical cancer. Checklist reviewing preventive services available has been given to the patient.    Reviewed patients plan of care and provided an AVS. The Basic Care Plan (routine screening as documented in Health Maintenance) for Sergio meets the Care Plan requirement. This Care Plan has been established and reviewed with the Patient.    Call cards to schedule annual     GI will call to schedule colon cancer screening    Use oTC medications clotrimazole, miconazole, Lamisil twice daily for 2 weeks then once daily for 4 weeks then once a week as a preventative for rash    Willard Guzman MD  [facility]    Identified Health Risks:  "

## 2022-06-17 ENCOUNTER — OFFICE VISIT (OUTPATIENT)
Dept: FAMILY MEDICINE | Facility: CLINIC | Age: 67
End: 2022-06-17
Payer: COMMERCIAL

## 2022-06-17 ENCOUNTER — LAB (OUTPATIENT)
Dept: LAB | Facility: CLINIC | Age: 67
End: 2022-06-17

## 2022-06-17 VITALS
OXYGEN SATURATION: 100 % | SYSTOLIC BLOOD PRESSURE: 147 MMHG | TEMPERATURE: 97.8 F | BODY MASS INDEX: 33.79 KG/M2 | WEIGHT: 277.5 LBS | HEART RATE: 64 BPM | DIASTOLIC BLOOD PRESSURE: 82 MMHG | HEIGHT: 76 IN

## 2022-06-17 DIAGNOSIS — I25.10 CORONARY ARTERY DISEASE INVOLVING NATIVE CORONARY ARTERY OF NATIVE HEART WITHOUT ANGINA PECTORIS: ICD-10-CM

## 2022-06-17 DIAGNOSIS — B35.6 TINEA CRURIS: ICD-10-CM

## 2022-06-17 DIAGNOSIS — Z12.5 SCREENING FOR PROSTATE CANCER: ICD-10-CM

## 2022-06-17 DIAGNOSIS — Z00.00 WELCOME TO MEDICARE PREVENTIVE VISIT: Primary | ICD-10-CM

## 2022-06-17 DIAGNOSIS — I10 ELEVATED BLOOD PRESSURE READING WITH DIAGNOSIS OF HYPERTENSION: ICD-10-CM

## 2022-06-17 DIAGNOSIS — Z12.11 SCREEN FOR COLON CANCER: ICD-10-CM

## 2022-06-17 DIAGNOSIS — M79.675 PAIN OF TOE OF LEFT FOOT: ICD-10-CM

## 2022-06-17 DIAGNOSIS — I71.20 THORACIC AORTIC ANEURYSM WITHOUT RUPTURE (H): ICD-10-CM

## 2022-06-17 DIAGNOSIS — E78.5 HYPERLIPIDEMIA LDL GOAL <70: ICD-10-CM

## 2022-06-17 DIAGNOSIS — Z11.59 NEED FOR HEPATITIS C SCREENING TEST: Primary | ICD-10-CM

## 2022-06-17 DIAGNOSIS — I10 ESSENTIAL HYPERTENSION WITH GOAL BLOOD PRESSURE LESS THAN 130/80: ICD-10-CM

## 2022-06-17 LAB
ALBUMIN SERPL-MCNC: 4.2 G/DL (ref 3.4–5)
ALP SERPL-CCNC: 57 U/L (ref 40–150)
ALT SERPL W P-5'-P-CCNC: 12 U/L (ref 0–70)
ANION GAP SERPL CALCULATED.3IONS-SCNC: 4 MMOL/L (ref 3–14)
AST SERPL W P-5'-P-CCNC: 19 U/L (ref 0–45)
BILIRUB SERPL-MCNC: 0.8 MG/DL (ref 0.2–1.3)
BUN SERPL-MCNC: 16 MG/DL (ref 7–30)
CALCIUM SERPL-MCNC: 9.6 MG/DL (ref 8.5–10.1)
CHLORIDE BLD-SCNC: 108 MMOL/L (ref 94–109)
CO2 SERPL-SCNC: 28 MMOL/L (ref 20–32)
CREAT SERPL-MCNC: 0.92 MG/DL (ref 0.66–1.25)
GFR SERPL CREATININE-BSD FRML MDRD: >90 ML/MIN/1.73M2
GLUCOSE BLD-MCNC: 96 MG/DL (ref 70–99)
HCV AB SERPL QL IA: NONREACTIVE
POTASSIUM BLD-SCNC: 4 MMOL/L (ref 3.4–5.3)
PROT SERPL-MCNC: 8.1 G/DL (ref 6.8–8.8)
PSA SERPL-MCNC: 0.6 UG/L (ref 0–4)
SODIUM SERPL-SCNC: 140 MMOL/L (ref 133–144)
URATE SERPL-MCNC: 6.9 MG/DL (ref 3.5–7.2)

## 2022-06-17 PROCEDURE — 99213 OFFICE O/P EST LOW 20 MIN: CPT | Mod: 25 | Performed by: FAMILY MEDICINE

## 2022-06-17 PROCEDURE — 36415 COLL VENOUS BLD VENIPUNCTURE: CPT

## 2022-06-17 PROCEDURE — G0472 HEP C SCREEN HIGH RISK/OTHER: HCPCS

## 2022-06-17 PROCEDURE — 84550 ASSAY OF BLOOD/URIC ACID: CPT

## 2022-06-17 PROCEDURE — 80053 COMPREHEN METABOLIC PANEL: CPT

## 2022-06-17 PROCEDURE — G0103 PSA SCREENING: HCPCS

## 2022-06-17 PROCEDURE — G0402 INITIAL PREVENTIVE EXAM: HCPCS | Performed by: FAMILY MEDICINE

## 2022-06-17 RX ORDER — LISINOPRIL 20 MG/1
20 TABLET ORAL DAILY
Qty: 90 TABLET | Refills: 3 | Status: SHIPPED | OUTPATIENT
Start: 2022-06-17 | End: 2023-07-07

## 2022-06-17 RX ORDER — ATORVASTATIN CALCIUM 20 MG/1
20 TABLET, FILM COATED ORAL DAILY
Qty: 90 TABLET | Refills: 3 | Status: SHIPPED | OUTPATIENT
Start: 2022-06-17 | End: 2023-07-25

## 2022-06-17 RX ORDER — LISINOPRIL 10 MG/1
10 TABLET ORAL DAILY
Qty: 30 TABLET | Refills: 0 | Status: CANCELLED | OUTPATIENT
Start: 2022-06-17

## 2022-06-17 ASSESSMENT — ENCOUNTER SYMPTOMS
NAUSEA: 0
EYE PAIN: 0
SHORTNESS OF BREATH: 1
DIARRHEA: 0
FEVER: 0
DYSURIA: 0
HEADACHES: 0
JOINT SWELLING: 1
HEMATURIA: 0
SORE THROAT: 0
FREQUENCY: 0
DIZZINESS: 0
WEAKNESS: 0
HEMATOCHEZIA: 0
PALPITATIONS: 0
COUGH: 0
ARTHRALGIAS: 0
ABDOMINAL PAIN: 0
CHILLS: 0
NERVOUS/ANXIOUS: 0
MYALGIAS: 0
CONSTIPATION: 0
PARESTHESIAS: 0
HEARTBURN: 0

## 2022-06-17 ASSESSMENT — ACTIVITIES OF DAILY LIVING (ADL): CURRENT_FUNCTION: NO ASSISTANCE NEEDED

## 2022-06-23 NOTE — RESULT ENCOUNTER NOTE
David Hill, your recent results are back and are all normal. Please contact if any questions.   Willard Guzman MD

## 2022-10-23 ENCOUNTER — HEALTH MAINTENANCE LETTER (OUTPATIENT)
Age: 67
End: 2022-10-23

## 2023-02-15 ENCOUNTER — OFFICE VISIT (OUTPATIENT)
Dept: SLEEP MEDICINE | Facility: CLINIC | Age: 68
End: 2023-02-15
Payer: COMMERCIAL

## 2023-02-15 VITALS
SYSTOLIC BLOOD PRESSURE: 150 MMHG | HEIGHT: 76 IN | WEIGHT: 294 LBS | HEART RATE: 67 BPM | DIASTOLIC BLOOD PRESSURE: 74 MMHG | OXYGEN SATURATION: 99 % | BODY MASS INDEX: 35.8 KG/M2

## 2023-02-15 DIAGNOSIS — G47.33 OSA ON CPAP: Primary | ICD-10-CM

## 2023-02-15 PROCEDURE — 99214 OFFICE O/P EST MOD 30 MIN: CPT | Performed by: STUDENT IN AN ORGANIZED HEALTH CARE EDUCATION/TRAINING PROGRAM

## 2023-02-15 ASSESSMENT — SLEEP AND FATIGUE QUESTIONNAIRES
HOW LIKELY ARE YOU TO NOD OFF OR FALL ASLEEP WHILE SITTING AND TALKING TO SOMEONE: WOULD NEVER DOZE
HOW LIKELY ARE YOU TO NOD OFF OR FALL ASLEEP WHILE SITTING QUIETLY AFTER LUNCH WITHOUT ALCOHOL: WOULD NEVER DOZE
HOW LIKELY ARE YOU TO NOD OFF OR FALL ASLEEP IN A CAR, WHILE STOPPED FOR A FEW MINUTES IN TRAFFIC: WOULD NEVER DOZE
HOW LIKELY ARE YOU TO NOD OFF OR FALL ASLEEP WHILE SITTING AND READING: MODERATE CHANCE OF DOZING
HOW LIKELY ARE YOU TO NOD OFF OR FALL ASLEEP WHILE SITTING INACTIVE IN A PUBLIC PLACE: WOULD NEVER DOZE
HOW LIKELY ARE YOU TO NOD OFF OR FALL ASLEEP WHILE WATCHING TV: SLIGHT CHANCE OF DOZING
HOW LIKELY ARE YOU TO NOD OFF OR FALL ASLEEP WHEN YOU ARE A PASSENGER IN A CAR FOR AN HOUR WITHOUT A BREAK: SLIGHT CHANCE OF DOZING
HOW LIKELY ARE YOU TO NOD OFF OR FALL ASLEEP WHILE LYING DOWN TO REST IN THE AFTERNOON WHEN CIRCUMSTANCES PERMIT: WOULD NEVER DOZE

## 2023-02-15 NOTE — NURSING NOTE
Rechecked blood pressure and noted in chart.    Patient to return in 1 year, sent patient a delayed 5th Finger message for 1 year follow up appointment reminder.    Faxed patient APAP perscription to [facility].      Jonn Walters CMA

## 2023-02-15 NOTE — PATIENT INSTRUCTIONS
MY INFORMATION ON SLEEP APNEA-  Sergio Davis    DOCTOR : Leah Phan MD  SLEEP CENTER :      MY CONTACT NUMBER:    [facility]  [phone]  [facility]      For general sleep health questions:   http://sleepeducation.org    For tips about PAP and COVID-19:  [email or web address]://[email or web address]/patients/[email or web address]/covid-19-[email or web address]-home-pap-therapy.pdf    For general info about COVID-19 including vaccines:  [email or web address].[email or web address]      Continue PAP therapy every night, for all hours that you are sleeping (including naps.)  As always, try to get at least 8 hours of sleep or more each day, keep a regular sleep schedule, and avoid sleep deprivation. Avoid alcohol.  Reasons that you might need a change to your pressure therapy would be weight gain or loss, waking having inadvertently removed your PAP overnight, having previously felt refreshed by sleep with CPAP use and now waking un-refreshed, and return of daytime sleepiness. Also, the development of new medical problems  (such as heart failure, stroke, medications such as narcotics) can sometimes affect breathing at night and change your PAP therapy needs.  Please bring PAP with you if you are hospitalized.  If anticipating surgery be sure to discuss with your surgeon that you have sleep apnea and use PAP therapy.    Maintain your equipment as recommended which includes routine cleaning and replacement of supplies.      Call DME for any questions regarding supplies or maintenance.    [facility], [facility] [phone]    Do not drive on engage in potentially dangerous activities if feeling sleepy.  Please follow up in sleep clinic again in 12 months.        Tips for your PAP use-    Mask fitting tips  Mask fitting exercise:    To improve your mask seal and your mobility at night, put mask on and secure in place.  Lie down in bed with full pressure and  roll to one side, adjust headgear while in that position to eliminate any leaks. Repeat process rolling to other side.     The mask seal does not have to be perfect:   CPAP machines are designed to make up for small leaks. However, you will not tolerate leaks blowing in your eyes so you will need to adjust.   Any leak should only be near or at the bottom of the mask.  We expect your mask to leak slightly at night.    Do not over-tighten the headgear straps, tighter IS NOT better, we expect minimal leak.    First try re-positioning the mask or headgear before tightening the headgear straps.  Mask leaks are expected due to changing sleeping positions. Try pulling the mask away from your skin allowing the cushion to re-inflate will minimize the leak.  If you struggle for a good fit, try turning the CPAP off and then readjust the mask by pulling it away from your face and then turning back on the CPAP.        Humidifier tips  Humidifiers can be adjusted to increase or decrease the amount of moisture according to your comfort level. You may need to adjust this frequently at first, but then might only change it with seasonal weather changes.     Try INCREASING the humidity if:  You experience a dry, irritated nasal passage or throat.  You have a runny, drippy nose or sneezing fits after using CPAP.  You experience nasal congestion during or after CPAP use.    Try DECREASING the humidity if:  You have excessive condensation or  rain out  in the tubing or mask.  Otherwise keep the tubing warm during the night by running it underneath the blankets or pillow.      Clinic visit after initial PAP set-up   Bring your equipment with you to your 5-8 week follow up clinic visit.  We will be extracting your data from the machine if not available from the cloud based [email or web address].        Travel  Always take your equipment with you when you travel.  If you fly with your equipment bring it on with you as a carry on.  Medical equipment does  not count as a carry on.    If you travel international the machines take 110-240v.  The only adapter needed is the adapter that will fit into the receptacle (outlet).    You may also want to bring an extension cord as many hotel rooms have limited outlets at the bedside.  Do not travel with water in your humidifier chamber.     Cleaning and Maintenance Guidelines    Equipment Frequency Cleaning Method   Mask First Day    Daily      Weekly Soak mask in hot soapy water for 30 minutes, rinse and air dry.  Wipe nasal cushion with a hot soapy (Ivory, baby shampoo) cloth and rinse.  Baby wipes may also be used.  Do not use anti-bacterial soaps,Sissy  liquid soap, rubbing alcohol, bleach or ammonia.  Wash frame in hot soapy water (Ivory, baby shampoo) rinse and let air dry   Headgear Biweekly Wash in hot soapy water, rinse and air dry   Reusable Gray Filter Weekly Wash in hot soapy water, rinse, put in towel squeeze moisture out, let air dry   Disposable White Filter Check Weekly Replace when brown or gray in color; at least every 2 to 3 months   Humidifier Chamber Daily    Weekly Empty distilled water from humidifier and let air dry    Hand wash in hot soapy water, rinse and air dry   Tubing Weekly Wash in hot soapy water, rinse and let air dry   Mask, Tubing and Humidifier Chamber As needed Disinfect: Soak in 1 part distilled white vinegar to 3 parts hot water for 30 minutes, rinse well and air dry  Not the material headgear        MASK AND SUPPLY REORDERING and EQUIPMENT NEEDS through your DME and per your insurance  Reminder: Most insurance companies will allow for a new mask, headgear, tubing, and reusable gray filter every six months.  Disposable white ultra-fine filters are covered monthly.      HOME AND SAFETY INSTRUCTIONS  Do not use frayed or cracked electrical cords, multi plug adaptors, or switched receptacles  Do not immerse electrical equipment into water  Assure that electrical cords do not become a tripping  hazard

## 2023-02-15 NOTE — ASSESSMENT & PLAN NOTE
Sergio Davis has mild sleep apnea. He is tolerating PAP well and reports adequate compliance with PAP therapy. Daytime symptoms are improved and reports positive benefits with PAP use.   FAVIO is adequately controlled with Auto CPAP at the current settings per compliance DL.     Prescription provided for replacement CPAP via [employer] recall    Prescription provided for renewal of PAP supplies.    Recommended him/her to continue using the CPAP regularly during sleep and instructed  to get  the supplies for the PAP replaced regularly.      Patient instructed to remember to bring PAP with him/her if hospitalized and if anticipating procedure that requires sedation/surgery to be sure to discuss with the provider/surgeon that he/she has sleep apnea and uses PAP therapy.

## 2023-02-15 NOTE — PROGRESS NOTES
[facility]  Consultation Note    Name: Sergio Davis MRN#: [id number]   Age: 67 year old YOB: 1955     Date of Consultation: 02/15/23  Consultation is requested by: No ref. provider found  Primary care provider: Willard Guzman MD    History of Present Illness:   Sergio Davis is a 67 year old male patient with FAVIO on CPAP, HTN, HLD, CAD, and thoracic aortic aneurysm    He is sent by No ref. provider found for a sleep consultation regarding     Sergio Davis main reason for visit: -Trying to get a new CPAP from [employer] through the recall process  Patient states problem(s) started: Over a year  Sergio Davis's goals for this visit: Get my CPAP prescription    He has had a previous sleep study about  10 years ago.   Important findings:  4/17/2013 Diagnostic PSG: AI 35.3, AHI 13.1, RDI 24.7, REM AHI 17.1, Supine AHI 51.4, time with O2 sat < 88% 0.6   5/15/2013 Titration PSG: Titrated to CPAP 6 cm H2O    Initial download with elevated AHI, CPAP increased to 7 cm H2O  Follow-up download with elevated CSA not seen during titration PSG. Believed to be treatment emergent thus no changes made to CPAP.  Patient remains on 7 cm of H2O at this time.    CPAP: Do you use a CPAP Machine at home: Yes  DME: None; buys supplies online  Overall, on a scale of 0-10 how would you rate your CPAP (0 poor, 10 great): 10    What type of mask do you use: Nasal Mask  Is your mask comfortable: Yes  Is your mask leaking: No  Do you notice snoring with mask on: No  Do you notice gasping arousals with mask on: No  Are you having significant oral or nasal dryness: No  Is the pressure setting comfortable: Yes  Do you feel well rested in the morning: Yes    Respironics  CPAP 7 cmH2O 30 day usage data:  100% of days with > 4 hours of use. 0/30 days with no use.   Average use 5 hours 35 minutes per day.   Average time of night in large leak 4 seconds.    AHI  4.8  events per hour.      SLEEP-WAKE SCHEDULE:   Work/School Days: Patient goes to school/work:     Usually gets into bed at    Takes patient about 10 to fall asleep  Has trouble falling asleep 1 nights per week  Wakes up in the middle of the night   times.  Wakes up due to Snorting self awake  He has trouble falling back asleep   times a week.   It usually takes   to get back to sleep  Patient is usually up at 5  Uses alarm: Yes    Weekends/Non-work Days/All Other Days:  Usually gets into bed at 530   Takes patient about 10 to fall asleep  Patient is usually up at 530  Uses alarm: Yes    Sleep Need  Patient gets  5 plus sleep on average   Patient thinks He needs about 5 sleep    Sergio Davis prefers to sleep in this position(s): Side   Patient states they do the following activities in bed:      Naps  Patient takes a purposeful nap   times a week and naps are usually hour in duration  He feels better after a nap: Yes  He dozes off unintentionally none days per week  Patient has had a driving accident or near-miss due to sleepiness/drowsiness: No      SLEEP DISRUPTIONS:  Breathing/Snoring  Patient snores:Yes  Other people complain about His snoring: Yes  Patient has been told He stops breathing in His sleep:Yes  He has issues with the following: Heartburn or reflux at night    Movement:  Patient gets pain, discomfort, with an urge to move:  No  It happens when He is resting:  No  It happens more at night:  No  Patient has been told Sergio Davis kicks His legs at night:  Yes     Behaviors in Sleep:  Sergio Davis has experienced the following behaviors while sleeping: Recurring Nightmares;Sleep-talking;Kicking or punching  He has experienced sudden muscle weakness during the day: No    Is there anything else you would like your sleep provider to know:      CAFFEINE AND OTHER SUBSTANCES:  Patient consumes caffeinated beverages per day:  5  Last caffeine use is usually: 6  List of any prescribed or over the counter stimulants  that patient takes:    List of any prescribed or over the counter sleep medication patient takes:    List of previous sleep medications that patient has tried:    Patient drinks alcohol to help them sleep: No  Patient drinks alcohol near bedtime: No    Family History:  Patient has a family member been diagnosed with a sleep disorder:              SCALES:  EPWORTH SLEEPINESS SCALE    [place] Sleepiness Scale ( NADINE Venegas  [id number]<br>ESS - [place]/English - Final version - 21 Nov 07 - [facility] [place].) 2/15/2023   Sitting and reading Moderate chance of dozing   Watching TV Slight chance of dozing   Sitting, inactive in a public place (e.g. a theatre or a meeting) Would never doze   As a passenger in a car for an hour without a break Slight chance of dozing   Lying down to rest in the afternoon when circumstances permit Would never doze   Sitting and talking to someone Would never doze   Sitting quietly after a lunch without alcohol Would never doze   In a car, while stopped for a few minutes in traffic Would never doze   [place] Score (MC) 4   [place] Score (Sleep) 4       INSOMNIA SEVERITY INDEX (KIMBERLY)    Insomnia Severity Index (KIMBERLY) 2/15/2023   Difficulty falling asleep 2   Difficulty staying asleep 1   Problems waking up too early 3   How SATISFIED/DISSATISFIED are you with your CURRENT sleep pattern? 4   How NOTICEABLE to others do you think your sleep problem is in terms of impairing the quality of your life? 1   How WORRIED/DISTRESSED are you about your current sleep problem? 2   To what extent do you consider your sleep problem to INTERFERE with your daily functioning (e.g. daytime fatigue, mood, ability to function at work/daily chores, concentration, memory, mood, etc.) CURRENTLY? 2   KIMBERLY Total Score 15   Guidelines for Scoring/Interpretation:  Total score categories:  0-7 = No clinically significant insomnia   8-14 = Subthreshold insomnia   15-21 = Clinical insomnia (moderate severity)  22-28 =  "Clinical insomnia (severe)  Used via courtesy of www.myhealth.va.gov with permission from Vern Knott PhD., [facility]      STOP BANG   STOP BANG Questionnaire (  2008, the American Society of Anesthesiologists, Inc. [employer] 2/15/2023   1. Snoring - Do you snore loudly (louder than talking or loud enough to be heard through closed doors)? No   2. Tired - Do you often feel tired, fatigued, or sleepy during daytime? No   3. Observed - Has anyone observed you stop breathing during your sleep? Yes   4. Blood pressure - Do you have or are you being treated for high blood pressure? Yes   5. BMI - BMI more than 35 kg/m2? No   6. Age - Age over 50 yr old? Yes   7. Neck circumference - Neck circumference greater than 40 cm? Yes   8. Gender - Gender male? Yes   STOP BANG Score (MC): 4 (High risk of FAVIO)   B/P Clinic: 150/74   BMI Clinic: -         GAD7  No flowsheet data found.      CAGE-AID  No flowsheet data found.    CAGE-AID reprinted with permission from the [place] Medical Journal, JAMILA Elliott. and LARA Man, \"Conjoint screening questionnaires for alcohol and drug abuse\" [place] Medical Journal 94: 135-140, 1995.      PATIENT HEALTH QUESTIONNAIRE-9 (PHQ - 9)  PHQ-9 ([employer]) 3/13/2013   No Interest In Doing Things 0   Feeling Depressed 3   Trouble Sleeping 2   Tired / No Energy 2   No appetite or Over-Eating 0   Feeling Bad about Self 0   Trouble Concentrating 3   Moving Slow or Restless 0   Suicidal Thoughts 0   Total Score 10     Developed by Wilfrid Trinh, Amanda Palmer, Stanley Philip and colleagues, with an educational michoacano from [employer] No permission required to reproduce, translate, display or distribute.      Allergies:    No Known Allergies    Medications:    Current Outpatient Medications   Medication Sig Dispense Refill     atorvastatin (LIPITOR) 20 MG tablet Take 1 tablet (20 mg) by mouth daily 90 tablet 3     lisinopril (ZESTRIL) 20 MG tablet Take " 1 tablet (20 mg) by mouth daily 90 tablet 3     metoprolol succinate ER (TOPROL-XL) 25 MG 24 hr tablet Take 1 tablet (25 mg) by mouth daily 90 tablet 3     ORDER FOR [facility] Resp[facility]ics REMSTAR 60 Series Auto CPAP 6cm H2O, [place] Fx wide nasal mask w/chinstrap       aspirin (ASA) 81 MG chewable tablet Take 81 mg by mouth daily (Patient not taking: Reported on 2/15/2023)       lisinopril (ZESTRIL) 10 MG tablet Take 1 tablet (10 mg) by mouth daily (Patient not taking: Reported on 2/15/2023) 30 tablet 0       Problem List:  Patient Active Problem List    Diagnosis Date Noted     FAVIO on CPAP 02/15/2023     Priority: Medium     4/17/2013 Diagnostic PSG: AI 35.3, AHI 13.1, RDI 24.7, REM AHI 17.1, Supine AHI 51.4, time with O2 sat < 88% 0.6     5/15/2013 Titration PSG: Titrated to CPAP 6 cm H2O       Thoracic aortic aneurysm without rupture 08/06/2021     Priority: Medium     Coronary artery disease involving native coronary artery of native heart without angina pectoris 08/06/2021     Priority: Medium     Status post coronary angiogram 07/14/2021     Priority: Medium     Abnormal stress test 06/18/2021     Priority: Medium     Added automatically from request for surgery [id number]       Family history of ischemic heart disease 04/01/2021     Priority: Medium     Vaccine refused by patient 03/31/2021     Priority: Medium     Tinea cruris 03/31/2021     Priority: Medium     Elevated blood pressure reading with diagnosis of hypertension 10/24/2016     Priority: Medium     BMI 33.0-33.9,adult 10/24/2016     Priority: Medium     Cataract of left eye 10/24/2016     Priority: Medium     Hyperlipidemia LDL goal <70      Priority: Medium     S/P complete repair of rotator cuff 12/14/2010     Priority: Medium        Past Medical/Surgical History:  Past Medical History:   Diagnosis Date     BMI 33.0-33.9,adult 10/24/2016     Rotator cuff tear      Past Surgical History:   Procedure Laterality Date     ARTHROSCOPY SHOULDER ROTATOR CUFF  "REPAIR      10/25/10     CV CORONARY ANGIOGRAM N/A 7/14/2021    Procedure: CV CORONARY ANGIOGRAM;  Surgeon: Jared Ellison MD;  [facility]     CV INSTANTANEOUS WAVE-FREE RATIO N/A 7/14/2021    Procedure: Instantaneous Wave-Free Ratio;  Surgeon: Jared Ellison MD;  Location:  [facility]     CV LEFT HEART CATH N/A 7/14/2021    Procedure: Left Heart Cath;  Surgeon: Jared Ellison MD;  Location:  [facility]       Social History:  Social History     Socioeconomic History     Marital status:      Spouse name: Not on file     Number of children: Not on file     Years of education: Not on file     Highest education level: Not on file   Occupational History     Not on file   Tobacco Use     Smoking status: Never     Smokeless tobacco: Never     Tobacco comments:     no second hand smoke   Substance and Sexual Activity     Alcohol use: Yes     Comment: \"very little\"     Drug use: No     Sexual activity: Yes     Birth control/protection: None   Other Topics Concern     Parent/sibling w/ CABG, MI or angioplasty before 65F 55M? Not Asked   Social History Narrative     Not on file     Social Determinants of Health     Financial Resource Strain: Not on file   Food Insecurity: Not on file   Transportation Needs: Not on file   Physical Activity: Not on file   Stress: Not on file   Social Connections: Not on file   Intimate Partner Violence: Not on file   Housing Stability: Not on file       Family History:  Family History   Problem Relation Age of Onset     Myocardial Infarction Father 61     Cancer Sister         Hodgekin's Lymphoma     Diabetes No family hx of      Hypertension No family hx of      Cerebrovascular Disease No family hx of      C.A.D. No family hx of      Heart Failure No family hx of        Review of Systems:   A complete review of systems reviewed by me is negative with the exeption of what has been mentioned in the history of present " "illness.      Physical Exam:   Vitals: BP (!) 150/74   Pulse 67   Ht 1.93 m (6' 4\")   Wt 133.4 kg (294 lb)   SpO2 99%   BMI 35.79 kg/m    BMI= Body mass index is 35.79 kg/m .     GENERAL: Healthy, alert and no distress  EYES: Eyes grossly normal to inspection.  No discharge or erythema, or obvious scleral/conjunctival abnormalities.  RESP: No audible wheeze, cough, or visible cyanosis.  No visible retractions or increased work of breathing.    SKIN: Visible skin clear. No significant rash, abnormal pigmentation or lesions.  NEURO: Cranial nerves grossly intact.  Mentation and speech appropriate for age.  PSYCH: Mentation appears normal, affect normal/bright, judgement and insight intact, normal speech and appearance well-groomed.         Data: All pertinent previous laboratory data reviewed     Recent Labs   Lab Test 06/17/22  1049 07/14/21  1308    137   POTASSIUM 4.0 4.2   CHLORIDE 108 106   CO2 28 28   ANIONGAP 4 3   GLC 96 93   BUN 16 18   CR 0.92 1.11   VANESSA 9.6 9.7       Recent Labs   Lab Test 07/14/21  1308   WBC 5.2   RBC 4.71   HGB 13.7   HCT 41.6   MCV 88   MCH 29.1   MCHC 32.9   RDW 13.7          Recent Labs   Lab Test 06/17/22  1049   PROTTOTAL 8.1   ALBUMIN 4.2   BILITOTAL 0.8   ALKPHOS 57   AST 19   ALT 12       TSH (mU/L)   Date Value   03/13/2013 1.19       No results found for: UAMP, UBARB, BENZODIAZEUR, UCANN, UCOC, OPIT, UPCP    No results found for: IRONSAT, [id number], ZURI    No results found for: PH, PHARTERIAL, PO2, GS1QCTWLBLH, SAT, PCO2, HCO3, BASEEXCESS, PAYTON, BEB    @LABRCNTIPR(phv:4,pco2v:4,po2v:4,hco3v:4,micheal:4,o2per:4)@    Echocardiology: No results found for this or any previous visit (from the past 4320 hour(s)).    Chest x-ray: No results found for this or any previous visit from the past 365 days.      Chest CT: No results found for this or any previous visit from the past 365 days.      PFT: Most Recent B[facility]ze Pulmonary Function Testing  No results found     Assessment " and Plan:     Problem List Items Addressed This Visit        Respiratory    FAVIO on CPAP - Primary     Sergio Davis has mild sleep apnea. He is tolerating PAP well and reports adequate compliance with PAP therapy. Daytime symptoms are improved and reports positive benefits with PAP use.   FAVIO is adequately controlled with Auto CPAP at the current settings per compliance DL.     Prescription provided for replacement CPAP via [employer] recall    Prescription provided for renewal of PAP supplies.    Recommended him/her to continue using the CPAP regularly during sleep and instructed  to get  the supplies for the PAP replaced regularly.      Patient instructed to remember to bring PAP with him/her if hospitalized and if anticipating procedure that requires sedation/surgery to be sure to discuss with the provider/surgeon that he/she has sleep apnea and uses PAP therapy.          Patient was strongly advised to avoid driving, operating any heavy machinery or other hazardous situations while drowsy or sleepy.  Patient was counseled on the importance of driving while alert, to pull over if drowsy, or nap before getting into the vehicle if sleepy.      Plan is for Sergio Davis to follow up in about 12 month(s).     The above note was dictated using voice recognition software. Although reviewed after completion, some word and grammatical error may remain . Please contact the author for any clarifications.    Total time spent reviewing medical records, history and physical examination, review of previous testing and interpretation as well as documentation on this date, 02/15/23: 34 minutes    Leah Phan MD on 2/15/2023   [facility]   [address], [address]   [address] [place] [address]   Appointments: [phone]    CC: No ref. provider found

## 2023-03-07 DIAGNOSIS — R94.39 ABNORMAL STRESS TEST: ICD-10-CM

## 2023-03-23 ENCOUNTER — MYC MEDICAL ADVICE (OUTPATIENT)
Dept: FAMILY MEDICINE | Facility: CLINIC | Age: 68
End: 2023-03-23
Payer: COMMERCIAL

## 2023-03-23 NOTE — TELEPHONE ENCOUNTER
"Requested Prescriptions   Pending Prescriptions Disp Refills     metoprolol succinate ER (TOPROL XL) 25 MG 24 hr tablet [Pharmacy Med Name: Metoprolol Succinate ER Oral Tablet Extended Release 24 Hour 25 MG] 90 tablet 0     Sig: Take 1 tablet (25 mg) by mouth daily       Beta-Blockers Protocol Failed - 3/7/2023  2:00 AM        Failed - Blood pressure under 140/90 in past 12 months     BP Readings from Last 3 Encounters:   02/15/23 (!) 150/74   06/17/22 (!) 147/82   04/29/22 129/84                 Failed - Recent (12 mo) or future (30 days) visit within the authorizing provider's specialty     Patient has had an office visit with the authorizing provider or a provider within the authorizing providers department within the previous 12 mos or has a future within next 30 days. See \"Patient Info\" tab in inbasket, or \"Choose Columns\" in Meds & Orders section of the refill encounter.              Passed - Patient is age 6 or older        Passed - Medication is active on med list         Routing refill request to provider for review/approval because medication did not pass protocol.    Pt was seen on 06/17/22    Paty Ruiz RN  [facility]   "

## 2023-03-24 RX ORDER — METOPROLOL SUCCINATE 25 MG/1
25 TABLET, EXTENDED RELEASE ORAL DAILY
Qty: 90 TABLET | Refills: 0 | Status: SHIPPED | OUTPATIENT
Start: 2023-03-24 | End: 2023-06-19

## 2023-04-28 ENCOUNTER — OFFICE VISIT (OUTPATIENT)
Dept: FAMILY MEDICINE | Facility: CLINIC | Age: 68
End: 2023-04-28
Payer: COMMERCIAL

## 2023-04-28 ENCOUNTER — APPOINTMENT (OUTPATIENT)
Dept: LAB | Facility: CLINIC | Age: 68
End: 2023-04-28
Payer: COMMERCIAL

## 2023-04-28 VITALS
WEIGHT: 286.5 LBS | TEMPERATURE: 97.5 F | SYSTOLIC BLOOD PRESSURE: 138 MMHG | HEART RATE: 61 BPM | HEIGHT: 76 IN | BODY MASS INDEX: 34.89 KG/M2 | RESPIRATION RATE: 12 BRPM | OXYGEN SATURATION: 99 % | DIASTOLIC BLOOD PRESSURE: 74 MMHG

## 2023-04-28 DIAGNOSIS — Z12.11 SCREEN FOR COLON CANCER: ICD-10-CM

## 2023-04-28 DIAGNOSIS — I71.20 THORACIC AORTIC ANEURYSM WITHOUT RUPTURE, UNSPECIFIED PART (H): ICD-10-CM

## 2023-04-28 DIAGNOSIS — G47.33 OSA ON CPAP: ICD-10-CM

## 2023-04-28 DIAGNOSIS — Z00.00 ENCOUNTER FOR PREVENTATIVE ADULT HEALTH CARE EXAMINATION: Primary | ICD-10-CM

## 2023-04-28 DIAGNOSIS — E78.5 HYPERLIPIDEMIA LDL GOAL <70: ICD-10-CM

## 2023-04-28 DIAGNOSIS — I25.10 CORONARY ARTERY DISEASE INVOLVING NATIVE CORONARY ARTERY OF NATIVE HEART WITHOUT ANGINA PECTORIS: ICD-10-CM

## 2023-04-28 DIAGNOSIS — Z12.5 SCREENING FOR PROSTATE CANCER: ICD-10-CM

## 2023-04-28 DIAGNOSIS — I10 WHITE COAT SYNDROME WITH DIAGNOSIS OF HYPERTENSION: ICD-10-CM

## 2023-04-28 DIAGNOSIS — R06.09 DOE (DYSPNEA ON EXERTION): ICD-10-CM

## 2023-04-28 PROCEDURE — G0438 PPPS, INITIAL VISIT: HCPCS | Performed by: FAMILY MEDICINE

## 2023-04-28 PROCEDURE — 99213 OFFICE O/P EST LOW 20 MIN: CPT | Mod: 25 | Performed by: FAMILY MEDICINE

## 2023-04-28 ASSESSMENT — ENCOUNTER SYMPTOMS
HEMATOCHEZIA: 0
NAUSEA: 0
PARESTHESIAS: 0
WEAKNESS: 0
NERVOUS/ANXIOUS: 0
HEARTBURN: 0
FEVER: 0
CONSTIPATION: 0
COUGH: 0
FREQUENCY: 0
DYSURIA: 0
HEADACHES: 0
SORE THROAT: 0
SHORTNESS OF BREATH: 1
EYE PAIN: 0
DIARRHEA: 0
MYALGIAS: 0
CHILLS: 0
JOINT SWELLING: 0
ARTHRALGIAS: 0
PALPITATIONS: 0
HEMATURIA: 0
DIZZINESS: 0
ABDOMINAL PAIN: 0

## 2023-04-28 ASSESSMENT — PAIN SCALES - GENERAL: PAINLEVEL: NO PAIN (0)

## 2023-04-28 ASSESSMENT — ACTIVITIES OF DAILY LIVING (ADL): CURRENT_FUNCTION: NO ASSISTANCE NEEDED

## 2023-04-28 NOTE — PATIENT INSTRUCTIONS
Ok to get chest XR and lab at [facility]    Gradually increase aerobic act, contact if unable, or chest pain

## 2023-04-28 NOTE — PROGRESS NOTES
"SUBJECTIVE:   Sergio is a 67 year old who presents for Preventive Visit.        4/28/2023     9:07 AM   Additional Questions   Roomed by Nathalia Terry   Patient has been advised of split billing requirements and indicates understanding: Yes  Are you in the first 12 months of your Medicare coverage?  No    Healthy Habits:     In general, how would you rate your overall health?  Good    Frequency of exercise:  None    Do you usually eat at least 4 servings of fruit and vegetables a day, include whole grains    & fiber and avoid regularly eating high fat or \"junk\" foods?  Yes    Taking medications regularly:  Yes    Medication side effects:  None    Ability to successfully perform activities of daily living:  No assistance needed    Home Safety:  No safety concerns identified    Hearing Impairment:  No hearing concerns    In the past 6 months, have you been bothered by leaking of urine?  No    In general, how would you rate your overall mental or emotional health?  Good      PHQ-2 Total Score: 0    Additional concerns today:  No      Have you ever done Advance Care Planning? (For example, a Health Directive, POLST, or a discussion with a medical provider or your loved ones about your wishes): Yes, patient states has an Advance Care Planning document and will bring a copy to the clinic.       Fall risk  Fallen 2 or more times in the past year?: No  Any fall with injury in the past year?: No    Cognitive Screening   1) Repeat 3 items (Leader, Season, Table)    2) Clock draw: NORMAL  3) 3 item recall: Recalls 2 objects   Results: NORMAL clock, 1-2 items recalled: COGNITIVE IMPAIRMENT LESS LIKELY    Mini-CogTM Copyright OSKAR Garcia. Licensed by the author for use in [facility]; reprinted with permission (zan@.[facility] [place]). All rights reserved.      Do you have sleep apnea, excessive snoring or daytime drowsiness?: yes    Reviewed and updated as needed this visit by clinical staff   Tobacco  Allergies  Meds       " "       Reviewed and updated as needed this visit by Provider                 Social History     Tobacco Use     Smoking status: Never     Smokeless tobacco: Never     Tobacco comments:     no second hand smoke   Vaping Use     Vaping status: Never Used   Substance Use Topics     Alcohol use: Yes     Comment: \"very little\"           4/28/2023     8:59 AM   Alcohol Use   Prescreen: >3 drinks/day or >7 drinks/week? No          View : No data to display.              Do you have a current opioid prescription? No  Do you use any other controlled substances or medications that are not prescribed by a provider? None    Concern - BERNARD  Onset: several years     Description:   Stairs, carrying heavy loads    Intensity: moderate    Progression of Symptoms:  same and constant    Accompanying Signs & Symptoms:  No chest pain, wheeze, cough    Previous history of similar problem:   yes    Precipitating factors:   Worsened by wt gain    Alleviating factors:  Improved by: exercise  Therapies Tried and outcome: none    Hyperlipidemia Follow-Up      Are you regularly taking any medication or supplement to lower your cholesterol?   Yes- atorvastatin    Are you having muscle aches or other side effects that you think could be caused by your cholesterol lowering medication?  No    Hypertension Follow-up      Do you check your blood pressure regularly outside of the clinic? No     Are you following a low salt diet? No    Are your blood pressures ever more than 140 on the top number (systolic) OR more   than 90 on the bottom number (diastolic), for example 140/90? No    Vascular Disease Follow-up      How often do you take nitroglycerin? Never    Do you take an aspirin every day? Yes       Gets imaging for thoracic aneurysm through cards     Current providers sharing in care for this patient include:   Patient Care Team:  Willard Guzman MD as PCP - General (Family Medicine)  Willard Guzman MD as Assigned PCP    The following " "health maintenance items are reviewed in Epic and correct as of today:  Health Maintenance   Topic Date Due     COVID-19 Vaccine (1) Never done     Pneumococcal Vaccine: 65+ Years (1 - PCV) Never done     DTAP/TDAP/TD IMMUNIZATION (1 - Tdap) Never done     ZOSTER IMMUNIZATION (1 of 2) Never done     COLORECTAL CANCER SCREENING  01/10/2019     INFLUENZA VACCINE (1) Never done     ANNUAL REVIEW OF HM ORDERS  06/17/2023     MEDICARE ANNUAL WELLNESS VISIT  04/28/2024     FALL RISK ASSESSMENT  04/28/2024     LIPID  03/31/2026     ADVANCE CARE PLANNING  04/28/2028     HEPATITIS C SCREENING  Completed     PHQ-2 (once per calendar year)  Completed     AORTIC ANEURYSM SCREENING (SYSTEM ASSIGNED)  Completed     IPV IMMUNIZATION  Aged Out     MENINGITIS IMMUNIZATION  Aged Out     Lab work is in process  Labs reviewed in EPIC  Vaccines: declines    Review of Systems   Constitutional: Negative for chills and fever.   HENT: Negative for congestion, ear pain, hearing loss and sore throat.    Eyes: Negative for pain and visual disturbance.   Respiratory: Positive for shortness of breath. Negative for cough.    Cardiovascular: Negative for chest pain, palpitations and peripheral edema.   Gastrointestinal: Negative for abdominal pain, constipation, diarrhea, heartburn, hematochezia and nausea.   Genitourinary: Negative for dysuria, frequency, genital sores, hematuria, impotence, penile discharge and urgency.   Musculoskeletal: Negative for arthralgias, joint swelling and myalgias.   Skin: Negative for rash.   Neurological: Negative for dizziness, weakness, headaches and paresthesias.   Psychiatric/Behavioral: Negative for mood changes. The patient is not nervous/anxious.      OBJECTIVE:   /74   Pulse 61   Temp 97.5  F (36.4  C) (Temporal)   Resp 12   Ht 1.932 m (6' 4.06\")   Wt 130 kg (286 lb 8 oz)   SpO2 99%   BMI 34.82 kg/m   Estimated body mass index is 34.82 kg/m  as calculated from the following:    Height as of this " "encounter: 1.932 m (6' 4.06\").    Weight as of this encounter: 130 kg (286 lb 8 oz).  Physical Exam  GENERAL: healthy, alert and no distress  EYES: Eyes grossly normal to inspection, PERRL and conjunctivae and sclerae normal  HENT: ear canals and TM's normal, nose and mouth without ulcers or lesions  NECK: no adenopathy, no asymmetry, masses, or scars and thyroid normal to palpation  RESP: lungs clear to auscultation - no rales, rhonchi or wheezes  CV: regular rate and rhythm, normal S1 S2, no S3 or S4, no murmur, click or rub, no peripheral edema and peripheral pulses strong  ABDOMEN: soft, nontender, no hepatosplenomegaly, no masses and bowel sounds normal   (male): normal male genitalia without lesions or urethral discharge, no hernia  RECTAL: normal sphincter tone, no rectal masses, prostate normal size, smooth, nontender without nodules or masses  MS: no gross musculoskeletal defects noted, no edema  SKIN: no suspicious lesions or rashes  NEURO: Normal strength and tone, mentation intact and speech normal  PSYCH: mentation appears normal, affect normal/bright    Diagnostic Test Results:  Labs reviewed in Epic    ASSESSMENT / PLAN:       ICD-10-CM    1. Encounter for preventative adult health care examination  Z00.00 Basic metabolic panel  (Ca, Cl, CO2, Creat, Gluc, K, Na, BUN)     Lipid panel reflex to direct LDL Fasting     ESR: Erythrocyte sedimentation rate     CRP, inflammation     Rheumatoid factor     Hemoglobin      2. White coat syndrome with diagnosis of hypertension  I10       3. BERNARD (dyspnea on exertion)  R06.09 XR Chest 2 Views      4. Coronary artery disease involving native coronary artery of native heart without angina pectoris  I25.10       5. FAVIO on CPAP  G47.33     Z99.89       6. Hyperlipidemia LDL goal <70  E78.5       7. Thoracic aortic aneurysm without rupture, unspecified part (H)  I71.20       8. BMI 34.0-34.9,adult  Z68.34       9. Screen for colon cancer  Z12.11 Fecal colorectal " "cancer screen FIT - Future (S+30)     Fecal colorectal cancer screen FIT - Future (S+30)      10. Screening for prostate cancer  Z12.5 PSA, screen          COUNSELING:  Reviewed preventive health counseling, as reflected in patient instructions       Regular exercise       Healthy diet/nutrition       Vision screening       Hearing screening       Colon cancer screening       Prostate cancer screening      BMI:   Estimated body mass index is 34.82 kg/m  as calculated from the following:    Height as of this encounter: 1.932 m (6' 4.06\").    Weight as of this encounter: 130 kg (286 lb 8 oz).   Weight management plan: Discussed healthy diet and exercise guidelines      He reports that he has never smoked. He has never used smokeless tobacco.      Appropriate preventive services were discussed with this patient, including applicable screening as appropriate for cardiovascular disease, diabetes, osteopenia/osteoporosis, and glaucoma.  As appropriate for age/gender, discussed screening for colorectal cancer, prostate cancer, breast cancer, and cervical cancer. Checklist reviewing preventive services available has been given to the patient.    Reviewed patients plan of care and provided an AVS. The Basic Care Plan (routine screening as documented in Health Maintenance) for Sergio meets the Care Plan requirement. This Care Plan has been established and reviewed with the Patient.    Patient Instructions   Ok to get chest XR and lab [facility]    Gradually increase aerobic act, contact if unable, or chest pain    Willard Guzman MD  [facility]    Identified Health Risks:    I have reviewed Opioid Use Disorder and Substance Use Disorder risk factors and made any needed referrals.     "

## 2023-04-28 NOTE — PROGRESS NOTES
"SUBJECTIVE:   CC: Sergio is an 67 year old who presents for preventative health visit.       4/28/2023     9:07 AM   Additional Questions   Roomed by Nathalia Terry   {Split Bill scripting  The purpose of this visit is to discuss your medical history and prevent health problems before you are sick. You may be responsible for a co-pay, coinsurance, or deductible if your visit today includes services such as checking on a sore throat, having an x-ray or lab test, or treating and evaluating a new or existing condition :379385}  {Patient advised of split billing (Optional):225826}  Healthy Habits:     In general, how would you rate your overall health?  Good    Frequency of exercise:  None    Do you usually eat at least 4 servings of fruit and vegetables a day, include whole grains    & fiber and avoid regularly eating high fat or \"junk\" foods?  Yes    Taking medications regularly:  Yes    Medication side effects:  None    Ability to successfully perform activities of daily living:  No assistance needed    Home Safety:  No safety concerns identified    Hearing Impairment:  No hearing concerns    In the past 6 months, have you been bothered by leaking of urine?  No    In general, how would you rate your overall mental or emotional health?  Good      PHQ-2 Total Score: 0    Additional concerns today:  No    {Add if <65 person on Medicare  - Required Questions (Optional):335113}  {Outside tests to abstract? :417685}    {additional problems to add (Optional):595039}    Today's PHQ-2 Score:       4/28/2023     8:59 AM   PHQ-2 ( 1999 [employer])   Q1: Little interest or pleasure in doing things 0   Q2: Feeling down, depressed or hopeless 0   PHQ-2 Score 0   Q1: Little interest or pleasure in doing things Not at all    Not at all   Q2: Feeling down, depressed or hopeless Not at all    Not at all   PHQ-2 Score 0    0           Social History     Tobacco Use     Smoking status: Never     Smokeless tobacco: Never     Tobacco " "comments:     no second hand smoke   Vaping Use     Vaping status: Never Used   Substance Use Topics     Alcohol use: Yes     Comment: \"very little\"     {Rooming staff  Click this link to complete the Prescreen if response below is not for today's visit  Alcohol Use Prescreen >3 drinks/day or > 7 drinks/week.  If the prescreen question answer is YES, complete the full AUDIT  :201391}        4/28/2023     8:59 AM   Alcohol Use   Prescreen: >3 drinks/day or >7 drinks/week? No   {add AUDIT responses (Optional) (A score of 7 for adult men is an indication of hazardous drinking; a score of 8 or more is an indication of an alcohol use disorder.  A score of 7 or more for adult women is an indication of hazardous drinking or an alchohol use disorder):527717}    Last PSA:   PSA   Date Value Ref Range Status   03/31/2021 0.61 0 - 4 ug/L Final     Comment:     Assay Method:  Chemiluminescence using Siemens Vista analyzer     Prostate Specific Antigen Screen   Date Value Ref Range Status   06/17/2022 0.60 0.00 - 4.00 ug/L Final       Reviewed orders with patient. Reviewed health maintenance and updated orders accordingly - { :576598::\"Yes\"}  {Chronicprobdata (optional):769378}    Reviewed and updated as needed this visit by clinical staff   Tobacco  Allergies  Meds              Reviewed and updated as needed this visit by Provider                 {HISTORY OPTIONS (Optional):308882}    Review of Systems  {MALE ROS (Optional):355049::\"CONSTITUTIONAL: NEGATIVE for fever, chills, change in weight\",\"INTEGUMENTARY/SKIN: NEGATIVE for worrisome rashes, moles or lesions\",\"EYES: NEGATIVE for vision changes or irritation\",\"ENT: NEGATIVE for ear, mouth and throat problems\",\"RESP: NEGATIVE for significant cough or SOB\",\"CV: NEGATIVE for chest pain, palpitations or peripheral edema\",\"GI: NEGATIVE for nausea, abdominal pain, heartburn, or change in bowel habits\",\" male: negative for dysuria, hematuria, decreased urinary stream, " "erectile dysfunction, urethral discharge\",\"MUSCULOSKELETAL: NEGATIVE for significant arthralgias or myalgia\",\"NEURO: NEGATIVE for weakness, dizziness or paresthesias\",\"PSYCHIATRIC: NEGATIVE for changes in mood or affect\"}    OBJECTIVE:   There were no vitals taken for this visit.    Physical Exam  {Exam Choices (Optional):215050}    {Diagnostic Test Results (Optional):288148::\"Diagnostic Test Results:\",\"Labs reviewed in Epic\"}    ASSESSMENT/PLAN:   {Diag Picklist:129276}    {Patient advised of split billing (Optional):787107}      COUNSELING:   {MALE COUNSELING MESSAGES:354499::\"Reviewed preventive health counseling, as reflected in patient instructions\"}      BMI:   Estimated body mass index is 35.79 kg/m  as calculated from the following:    Height as of 2/15/23: 1.93 m (6' 4\").    Weight as of 2/15/23: 133.4 kg (294 lb).   {Weight Management Plan needed for ACO:948411}      He reports that he has never smoked. He has never used smokeless tobacco.      {Counseling Resources  [place] Preventive Services Task Force  Cholesterol Screening  Health diet/nutrition  Pooled Cohorts Equation Calculator  USDA's MyPlate  ASA Prophylaxis  Lung CA Screening  Osteoporosis prevention/bone health :822547}  {Prostate Cancer Screening  Consider for men 55-69 per guidance from USPSTF :954462}    Willard Guzman MD  [facility]  "

## 2023-05-04 ENCOUNTER — ANCILLARY PROCEDURE (OUTPATIENT)
Dept: GENERAL RADIOLOGY | Facility: CLINIC | Age: 68
End: 2023-05-04
Attending: FAMILY MEDICINE
Payer: COMMERCIAL

## 2023-05-04 ENCOUNTER — LAB (OUTPATIENT)
Dept: LAB | Facility: CLINIC | Age: 68
End: 2023-05-04
Payer: COMMERCIAL

## 2023-05-04 DIAGNOSIS — Z00.00 ENCOUNTER FOR PREVENTATIVE ADULT HEALTH CARE EXAMINATION: ICD-10-CM

## 2023-05-04 DIAGNOSIS — R06.09 DOE (DYSPNEA ON EXERTION): ICD-10-CM

## 2023-05-04 DIAGNOSIS — Z12.5 SCREENING FOR PROSTATE CANCER: ICD-10-CM

## 2023-05-04 LAB
ANION GAP SERPL CALCULATED.3IONS-SCNC: 3 MMOL/L (ref 3–14)
BUN SERPL-MCNC: 20 MG/DL (ref 7–30)
CALCIUM SERPL-MCNC: 8.9 MG/DL (ref 8.5–10.1)
CHLORIDE BLD-SCNC: 111 MMOL/L (ref 94–109)
CHOLEST SERPL-MCNC: 115 MG/DL
CO2 SERPL-SCNC: 29 MMOL/L (ref 20–32)
CREAT SERPL-MCNC: 1.01 MG/DL (ref 0.66–1.25)
CRP SERPL-MCNC: <2.9 MG/L (ref 0–8)
ERYTHROCYTE [SEDIMENTATION RATE] IN BLOOD BY WESTERGREN METHOD: 16 MM/HR (ref 0–20)
FASTING STATUS PATIENT QL REPORTED: YES
GFR SERPL CREATININE-BSD FRML MDRD: 81 ML/MIN/1.73M2
GLUCOSE BLD-MCNC: 96 MG/DL (ref 70–99)
HDLC SERPL-MCNC: 44 MG/DL
HEMOCCULT STL QL IA: NEGATIVE
HGB BLD-MCNC: 12.7 G/DL (ref 13.3–17.7)
LDLC SERPL CALC-MCNC: 56 MG/DL
NONHDLC SERPL-MCNC: 71 MG/DL
POTASSIUM BLD-SCNC: 4.3 MMOL/L (ref 3.4–5.3)
PSA SERPL-MCNC: 0.48 UG/L (ref 0–4)
SODIUM SERPL-SCNC: 143 MMOL/L (ref 133–144)
TRIGL SERPL-MCNC: 77 MG/DL

## 2023-05-04 PROCEDURE — 80048 BASIC METABOLIC PNL TOTAL CA: CPT

## 2023-05-04 PROCEDURE — 86431 RHEUMATOID FACTOR QUANT: CPT

## 2023-05-04 PROCEDURE — 82274 ASSAY TEST FOR BLOOD FECAL: CPT | Performed by: FAMILY MEDICINE

## 2023-05-04 PROCEDURE — 85018 HEMOGLOBIN: CPT

## 2023-05-04 PROCEDURE — 80061 LIPID PANEL: CPT

## 2023-05-04 PROCEDURE — 71046 X-RAY EXAM CHEST 2 VIEWS: CPT | Mod: TC | Performed by: RADIOLOGY

## 2023-05-04 PROCEDURE — 85652 RBC SED RATE AUTOMATED: CPT

## 2023-05-04 PROCEDURE — G0103 PSA SCREENING: HCPCS

## 2023-05-04 PROCEDURE — 86140 C-REACTIVE PROTEIN: CPT

## 2023-05-04 PROCEDURE — 36415 COLL VENOUS BLD VENIPUNCTURE: CPT

## 2023-05-05 LAB — RHEUMATOID FACT SER NEPH-ACNC: <7 IU/ML

## 2023-05-14 DIAGNOSIS — D64.9 NORMOCYTIC ANEMIA: Primary | ICD-10-CM

## 2023-05-14 NOTE — RESULT ENCOUNTER NOTE
David Hill: Your recent results are within normal limits, except hemoglobin and red blood cell count dropped again. Please schedule nonfasting lab only appointment to have this rechecked. Please contact if questions; nice to see you!     Willard MUNROE

## 2023-05-14 NOTE — RESULT ENCOUNTER NOTE
David Hill: good news! Your recent result to screen for colon cancer is normal. Recheck in 1 year. Contact if questions; nice to see you!     Willard MUNROE

## 2023-05-14 NOTE — RESULT ENCOUNTER NOTE
David Hill, your recent chest XR result is normal. Please contact if any questions.   Willard MUNROE    IMPRESSION: No acute cardiopulmonary disease.

## 2023-06-16 DIAGNOSIS — R94.39 ABNORMAL STRESS TEST: ICD-10-CM

## 2023-06-19 RX ORDER — METOPROLOL SUCCINATE 25 MG/1
25 TABLET, EXTENDED RELEASE ORAL DAILY
Qty: 90 TABLET | Refills: 3 | Status: SHIPPED | OUTPATIENT
Start: 2023-06-19 | End: 2023-11-15

## 2023-06-19 NOTE — TELEPHONE ENCOUNTER
"Requested Prescriptions   Pending Prescriptions Disp Refills     metoprolol succinate ER (TOPROL XL) 25 MG 24 hr tablet [Pharmacy Med Name: Metoprolol Succinate ER Oral Tablet Extended Release 24 Hour 25 MG] 90 tablet 0     Sig: Take 1 tablet (25 mg) by mouth daily       Beta-Blockers Protocol Failed - 6/16/2023 10:39 PM        Failed - Recent (12 mo) or future (30 days) visit within the authorizing provider's specialty     Patient has had an office visit with the authorizing provider or a provider within the authorizing providers department within the previous 12 mos or has a future within next 30 days. See \"Patient Info\" tab in inbasket, or \"Choose Columns\" in Meds & Orders section of the refill encounter.              Passed - Blood pressure under 140/90 in past 12 months     BP Readings from Last 3 Encounters:   04/28/23 138/74   02/15/23 (!) 150/74   06/17/22 (!) 147/82                 Passed - Patient is age 6 or older        Passed - Medication is active on med list           Routing refill request to provider for review/approval because medication did not pass protocol.    Pt was last seen on 04/28/23    Paty Ruiz RN  [facility]   "

## 2023-07-07 DIAGNOSIS — I10 ELEVATED BLOOD PRESSURE READING WITH DIAGNOSIS OF HYPERTENSION: ICD-10-CM

## 2023-07-07 RX ORDER — LISINOPRIL 20 MG/1
20 TABLET ORAL DAILY
Qty: 90 TABLET | Refills: 0 | Status: SHIPPED | OUTPATIENT
Start: 2023-07-07 | End: 2023-10-04

## 2023-07-07 NOTE — TELEPHONE ENCOUNTER
"Requested Prescriptions   Pending Prescriptions Disp Refills     lisinopril (ZESTRIL) 20 MG tablet [Pharmacy Med Name: Lisinopril Oral Tablet 20 MG] 90 tablet 0     Sig: Take 1 tablet (20 mg) by mouth daily       ACE Inhibitors (Including Combos) Protocol Passed - 7/7/2023  8:10 AM        Passed - Blood pressure under 140/90 in past 12 months     BP Readings from Last 3 Encounters:   04/28/23 138/74   02/15/23 (!) 150/74   06/17/22 (!) 147/82                 Passed - Recent (12 mo) or future (30 days) visit within the authorizing provider's specialty     Patient has had an office visit with the authorizing provider or a provider within the authorizing providers department within the previous 12 mos or has a future within next 30 days. See \"Patient Info\" tab in inbasket, or \"Choose Columns\" in Meds & Orders section of the refill encounter.              Passed - Medication is active on med list        Passed - Patient is age 18 or older        Passed - Normal serum creatinine on file in past 12 months     Recent Labs   Lab Test 05/04/23  0748   CR 1.01       Ok to refill medication if creatinine is low          Passed - Normal serum potassium on file in past 12 months     Recent Labs   Lab Test 05/04/23  0748   POTASSIUM 4.3                Prescription approved per [facility] Refill Protocol.  Fransico Elliott RN  [facility]       "

## 2023-07-25 DIAGNOSIS — E78.5 HYPERLIPIDEMIA LDL GOAL <70: ICD-10-CM

## 2023-07-25 RX ORDER — ATORVASTATIN CALCIUM 20 MG/1
20 TABLET, FILM COATED ORAL DAILY
Qty: 90 TABLET | Refills: 0 | Status: SHIPPED | OUTPATIENT
Start: 2023-07-25 | End: 2023-10-24

## 2023-07-25 NOTE — TELEPHONE ENCOUNTER
"Requested Prescriptions   Pending Prescriptions Disp Refills    atorvastatin (LIPITOR) 20 MG tablet [Pharmacy Med Name: Atorvastatin Calcium Oral Tablet 20 MG] 90 tablet 0     Sig: Take 1 tablet (20 mg) by mouth daily       Statins Protocol Passed - 7/25/2023  2:01 AM        Passed - LDL on file in past 12 months     Recent Labs   Lab Test 05/04/23  0748   LDL 56             Passed - No abnormal creatine kinase in past 12 months     No lab results found.             Passed - Recent (12 mo) or future (30 days) visit within the authorizing provider's specialty     Patient has had an office visit with the authorizing provider or a provider within the authorizing providers department within the previous 12 mos or has a future within next 30 days. See \"Patient Info\" tab in inbasket, or \"Choose Columns\" in Meds & Orders section of the refill encounter.              Passed - Medication is active on med list        Passed - Patient is age 18 or older            Prescription approved per [facility] Refill Protocol.     Pt was last seen on 04/28/23    Paty Ruiz RN  [facility]   "

## 2023-10-04 DIAGNOSIS — I10 ELEVATED BLOOD PRESSURE READING WITH DIAGNOSIS OF HYPERTENSION: ICD-10-CM

## 2023-10-04 RX ORDER — LISINOPRIL 20 MG/1
20 TABLET ORAL DAILY
Qty: 90 TABLET | Refills: 0 | Status: SHIPPED | OUTPATIENT
Start: 2023-10-04 | End: 2024-01-02

## 2023-10-04 NOTE — TELEPHONE ENCOUNTER
"Requested Prescriptions   Pending Prescriptions Disp Refills    lisinopril (ZESTRIL) 20 MG tablet [Pharmacy Med Name: Lisinopril Oral Tablet 20 MG] 90 tablet 0     Sig: Take 1 tablet (20 mg) by mouth daily       ACE Inhibitors (Including Combos) Protocol Passed - 10/4/2023 10:08 AM        Passed - Blood pressure under 140/90 in past 12 months     BP Readings from Last 3 Encounters:   04/28/23 138/74   02/15/23 (!) 150/74   06/17/22 (!) 147/82                 Passed - Recent (12 mo) or future (30 days) visit within the authorizing provider's specialty     Patient has had an office visit with the authorizing provider or a provider within the authorizing providers department within the previous 12 mos or has a future within next 30 days. See \"Patient Info\" tab in inbasket, or \"Choose Columns\" in Meds & Orders section of the refill encounter.              Passed - Medication is active on med list        Passed - Patient is age 18 or older        Passed - Normal serum creatinine on file in past 12 months     Recent Labs   Lab Test 05/04/23  0748   CR 1.01       Ok to refill medication if creatinine is low          Passed - Normal serum potassium on file in past 12 months     Recent Labs   Lab Test 05/04/23  0748   POTASSIUM 4.3                Prescription approved per [facility] Refill Protocol.  Thanks,   Fransico Elliott RN  [facility]     "

## 2023-10-19 ENCOUNTER — OFFICE VISIT (OUTPATIENT)
Dept: URGENT CARE | Facility: URGENT CARE | Age: 68
End: 2023-10-19
Payer: COMMERCIAL

## 2023-10-19 ENCOUNTER — ANCILLARY PROCEDURE (OUTPATIENT)
Dept: ULTRASOUND IMAGING | Facility: CLINIC | Age: 68
End: 2023-10-19
Attending: INTERNAL MEDICINE
Payer: COMMERCIAL

## 2023-10-19 ENCOUNTER — ANCILLARY PROCEDURE (OUTPATIENT)
Dept: GENERAL RADIOLOGY | Facility: CLINIC | Age: 68
End: 2023-10-19
Attending: PHYSICIAN ASSISTANT
Payer: COMMERCIAL

## 2023-10-19 ENCOUNTER — OFFICE VISIT (OUTPATIENT)
Dept: PEDIATRICS | Facility: CLINIC | Age: 68
End: 2023-10-19
Payer: COMMERCIAL

## 2023-10-19 VITALS
HEART RATE: 67 BPM | OXYGEN SATURATION: 99 % | DIASTOLIC BLOOD PRESSURE: 83 MMHG | BODY MASS INDEX: 35.12 KG/M2 | WEIGHT: 289 LBS | TEMPERATURE: 97.7 F | RESPIRATION RATE: 16 BRPM | SYSTOLIC BLOOD PRESSURE: 163 MMHG

## 2023-10-19 VITALS
DIASTOLIC BLOOD PRESSURE: 79 MMHG | TEMPERATURE: 97.5 F | HEART RATE: 60 BPM | OXYGEN SATURATION: 99 % | RESPIRATION RATE: 14 BRPM | SYSTOLIC BLOOD PRESSURE: 130 MMHG

## 2023-10-19 DIAGNOSIS — M79.672 LEFT FOOT PAIN: Primary | ICD-10-CM

## 2023-10-19 DIAGNOSIS — M79.89 PAIN AND SWELLING OF LEFT LOWER EXTREMITY: ICD-10-CM

## 2023-10-19 DIAGNOSIS — D64.9 ANEMIA, UNSPECIFIED TYPE: ICD-10-CM

## 2023-10-19 DIAGNOSIS — R60.0 LEG EDEMA, LEFT: ICD-10-CM

## 2023-10-19 DIAGNOSIS — M79.605 PAIN AND SWELLING OF LEFT LOWER EXTREMITY: ICD-10-CM

## 2023-10-19 DIAGNOSIS — M79.675 PAIN OF TOE OF LEFT FOOT: ICD-10-CM

## 2023-10-19 DIAGNOSIS — M10.072 ACUTE IDIOPATHIC GOUT INVOLVING TOE OF LEFT FOOT: Primary | ICD-10-CM

## 2023-10-19 DIAGNOSIS — M79.672 LEFT FOOT PAIN: ICD-10-CM

## 2023-10-19 PROBLEM — E66.01 CLASS 2 SEVERE OBESITY DUE TO EXCESS CALORIES WITH SERIOUS COMORBIDITY IN ADULT (H): Status: ACTIVE | Noted: 2023-10-19

## 2023-10-19 PROBLEM — E66.812 CLASS 2 SEVERE OBESITY DUE TO EXCESS CALORIES WITH SERIOUS COMORBIDITY IN ADULT (H): Status: ACTIVE | Noted: 2023-10-19

## 2023-10-19 LAB
BASO+EOS+MONOS # BLD AUTO: ABNORMAL 10*3/UL
BASO+EOS+MONOS NFR BLD AUTO: ABNORMAL %
BASOPHILS # BLD AUTO: 0 10E3/UL (ref 0–0.2)
BASOPHILS NFR BLD AUTO: 0 %
EOSINOPHIL # BLD AUTO: 0.1 10E3/UL (ref 0–0.7)
EOSINOPHIL NFR BLD AUTO: 1 %
ERYTHROCYTE [DISTWIDTH] IN BLOOD BY AUTOMATED COUNT: 13.5 % (ref 10–15)
HCT VFR BLD AUTO: 38.7 % (ref 40–53)
HGB BLD-MCNC: 12.7 G/DL (ref 13.3–17.7)
IMM GRANULOCYTES # BLD: 0 10E3/UL
IMM GRANULOCYTES NFR BLD: 0 %
LYMPHOCYTES # BLD AUTO: 1.2 10E3/UL (ref 0.8–5.3)
LYMPHOCYTES NFR BLD AUTO: 18 %
MCH RBC QN AUTO: 29.1 PG (ref 26.5–33)
MCHC RBC AUTO-ENTMCNC: 32.8 G/DL (ref 31.5–36.5)
MCV RBC AUTO: 89 FL (ref 78–100)
MONOCYTES # BLD AUTO: 0.7 10E3/UL (ref 0–1.3)
MONOCYTES NFR BLD AUTO: 10 %
NEUTROPHILS # BLD AUTO: 4.8 10E3/UL (ref 1.6–8.3)
NEUTROPHILS NFR BLD AUTO: 71 %
NRBC # BLD AUTO: 0 10E3/UL
NRBC BLD AUTO-RTO: 0 /100
PLATELET # BLD AUTO: 213 10E3/UL (ref 150–450)
RBC # BLD AUTO: 4.36 10E6/UL (ref 4.4–5.9)
URATE SERPL-MCNC: 6.3 MG/DL (ref 3.4–7)
WBC # BLD AUTO: 6.9 10E3/UL (ref 4–11)

## 2023-10-19 PROCEDURE — 36415 COLL VENOUS BLD VENIPUNCTURE: CPT | Performed by: INTERNAL MEDICINE

## 2023-10-19 PROCEDURE — 99207 REFERRAL TO ACUTE AND DIAGNOSTIC SERVICES: CPT | Performed by: PHYSICIAN ASSISTANT

## 2023-10-19 PROCEDURE — 93971 EXTREMITY STUDY: CPT | Mod: LT | Performed by: RADIOLOGY

## 2023-10-19 PROCEDURE — 99215 OFFICE O/P EST HI 40 MIN: CPT | Performed by: INTERNAL MEDICINE

## 2023-10-19 PROCEDURE — 85025 COMPLETE CBC W/AUTO DIFF WBC: CPT | Performed by: INTERNAL MEDICINE

## 2023-10-19 PROCEDURE — 73630 X-RAY EXAM OF FOOT: CPT | Mod: TC | Performed by: RADIOLOGY

## 2023-10-19 PROCEDURE — 84550 ASSAY OF BLOOD/URIC ACID: CPT | Performed by: INTERNAL MEDICINE

## 2023-10-19 RX ORDER — INDOMETHACIN 50 MG/1
50 CAPSULE ORAL 2 TIMES DAILY WITH MEALS
Qty: 30 CAPSULE | Refills: 0 | Status: SHIPPED | OUTPATIENT
Start: 2023-10-19

## 2023-10-19 ASSESSMENT — ENCOUNTER SYMPTOMS
NEUROLOGICAL NEGATIVE: 1
HEMATURIA: 0
CHEST TIGHTNESS: 0
NAUSEA: 0
SHORTNESS OF BREATH: 0
FEVER: 0
WHEEZING: 0
HEADACHES: 0
CHILLS: 0
ALLERGIC/IMMUNOLOGIC NEGATIVE: 1
COUGH: 0
SORE THROAT: 0
FATIGUE: 0
DYSURIA: 0
ABDOMINAL PAIN: 0
PALPITATIONS: 0
PALPITATIONS: 0
DIARRHEA: 0
JOINT SWELLING: 1
NAUSEA: 0
DIZZINESS: 0
FREQUENCY: 0
VOMITING: 0
FEVER: 0
MYALGIAS: 0
CONSTITUTIONAL NEGATIVE: 1
CHILLS: 0
HEADACHES: 0
COUGH: 0
ARTHRALGIAS: 1
RESPIRATORY NEGATIVE: 1
GASTROINTESTINAL NEGATIVE: 1
SHORTNESS OF BREATH: 0

## 2023-10-19 ASSESSMENT — PAIN SCALES - GENERAL: PAINLEVEL: MILD PAIN (3)

## 2023-10-19 NOTE — PROGRESS NOTES
Assessment & Plan   Problem List Items Addressed This Visit    None  Visit Diagnoses       Pain and swelling of left lower extremity    -  Primary    Relevant Medications    indomethacin (INDOCIN) 50 MG capsule    Other Relevant Orders    CBC with platelets differential (Completed)    US Lower Extremity Venous Duplex Left (Completed)    Pain of toe of left foot        Relevant Medications    indomethacin (INDOCIN) 50 MG capsule    Other Relevant Orders    CBC with platelets differential (Completed)    Uric acid (Completed)    Ankle/Foot Bracing Supplies DME Post-op Shoe; Right    Anemia, unspecified type        Acute idiopathic gout involving toe of left foot        Relevant Medications    indomethacin (INDOCIN) 50 MG capsule           67-year-old gentleman with left lower extremity swelling, redness of foot and redness, pain and swelling localized to first MCP joint   Differential includes deep venous thrombosis, cellulitis, gout.  X-ray-no significant osteoarthritis noted.      Patient Instructions         Ultrasound preliminary read shows no evidence of blood clot/deep vein thrombosis.  Blood counts.  White count is not elevated.  This suggests it is not related to skin infection such as cellulitis.  You have a slight anemia.  This is stable from this spring.  You need to recheck with Dr. Guzman regarding anemia.  Please make sure you are up-to-date on your colon cancer screening.  Uric acid.    Symptoms most consistent with gout.  Kidney function is stable so I will place you on an anti-inflammatory.  Take Indocin 50 mg 2 times a day with food.  Once pain is resolved take once a day for 2 days then stop.  Wear postop shoe for comfort and avoiding bending toe joint at area of pain.  Ice to area.  Rest.  Elevate foot.    Follow-up with primary for this episode in the next 1 to 2 weeks.    I will send copy of this chart visit to your primary and referring provider.        Component      Latest Ref Rng  7/14/2021  1:08 PM 5/4/2023  7:48 AM 10/19/2023  11:51 AM   WBC      4.0 - 11.0 10e3/uL 5.2   6.9    RBC Count      4.40 - 5.90 10e6/uL 4.71   4.36 (L)    Hemoglobin      13.3 - 17.7 g/dL 13.7  12.7 (L)  12.7 (L)    Hematocrit      40.0 - 53.0 % 41.6   38.7 (L)    MCV      78 - 100 fL 88   89    MCH      26.5 - 33.0 pg 29.1   29.1    MCHC      31.5 - 36.5 g/dL 32.9   32.8    RDW      10.0 - 15.0 % 13.7   13.5    Platelet Count      150 - 450 10e3/uL 203   213    % Neutrophils      %   71    % Lymphocytes      %   18    % Monocytes      %   10    % Eosinophils      %   1    % Basophils      %   0    % Immature Granulocytes      %   0    NRBCs per 100 WBC      <1 /100   0    Absolute Neutrophils      1.6 - 8.3 10e3/uL   4.8    Absolute Lymphocytes      0.8 - 5.3 10e3/uL   1.2    Absolute Monocytes      0.0 - 1.3 10e3/uL   0.7    Absolute Eosinophils      0.0 - 0.7 10e3/uL   0.1    Absolute Basophils      0.0 - 0.2 10e3/uL   0.0    Absolute Immature Granulocytes      <=0.4 10e3/uL   0.0    Absolute NRBCs      10e3/uL   0.0       Legend:  (L) Low    No follow-ups on file.    CC chart to referring provider and primary care    Marina Montes De Oca MD  [facility]      Review of external notes as documented elsewhere in note  40 minutes spent by me on the date of the encounter doing chart review, history and exam, documentation and further activities per the note         Brenda Hill is a 67 year old, presenting for the following health issues:  Deep Vein Thrombosis      HPI     Evaluation for possible DVT  Onset/Duration: 10/17   Description:       Location: Left foot swelling       Redness: YES       Pain: mild       Warmth: YES       Joint swelling YES  Progression of symptoms worse  Accompanying signs and symptoms:       Fevers: no        Numbness/tingling/weakness: no        Chest pain/pleurisy: no        Shortness of breath: no   History        Trauma: no         Recent travel/when:  YES- 1 Week before        Previous history of DVT: no         Family history of DVT: no         Recent surgery: no   Aggravating factors include: walking  Therapies tried and outcome: rest/inactivity and ice  Prior surgery on arteries of veins in this area: No      Per [facility] provider.  Marina Montes De Oca MD  Referral from [facility] urgent care provider Franky Herrera.  67-year-old gentleman with history of overnight trauma to left foot while sleeping as he got foot caught in the bed while turning over -occurred 2 days ago.  Subsequently developed left great toe pain redness with swelling of left lower extremity to the mid calf.  Treatment ice.    Oral x-ray obtained at urgent care visit.  I also reviewed x-ray which was negative for fracture.  No calcium deposits to suggest pseudogout.  No significant arthritis noted.  Referral for evaluation to rule out DVT with ultrasound of lower extremity and also work-up for potential gout with uric acid.    Per patient-   Toe caught in bed sheets 2 days - Redness & swelling noticed in evening  Pain progressed through out the day  Following day rested with ice pack        Review of Systems   Constitutional:  Negative for chills, fatigue and fever.   Respiratory:  Negative for cough and shortness of breath.    Cardiovascular:  Negative for chest pain and palpitations.   Gastrointestinal:  Negative for nausea.   Neurological:  Negative for dizziness and headaches.          Component      Latest Ref Rng 5/4/2023  7:48 AM   Sodium      133 - 144 mmol/L 143    Potassium      3.4 - 5.3 mmol/L 4.3    Chloride      94 - 109 mmol/L 111 (H)    Carbon Dioxide      20 - 32 mmol/L 29    Anion Gap      3 - 14 mmol/L 3    Urea Nitrogen      7 - 30 mg/dL 20    Creatinine      0.66 - 1.25 mg/dL 1.01    Calcium      8.5 - 10.1 mg/dL 8.9    Glucose      70 - 99 mg/dL 96      Patient Active Problem List   Diagnosis    S/P complete repair of rotator cuff    Hyperlipidemia LDL goal <70     White coat syndrome with diagnosis of hypertension    BMI 34.0-34.9,adult    Cataract of left eye    Vaccine refused by patient    Tinea cruris    Family history of ischemic heart disease    Abnormal stress test    Status post coronary angiogram    Thoracic aortic aneurysm without rupture (H24)    Coronary artery disease involving native coronary artery of native heart without angina pectoris    FAVIO on CPAP    Class 2 severe obesity due to excess calories with serious comorbidity in adult (H)     Current Outpatient Medications   Medication Sig Dispense Refill    atorvastatin (LIPITOR) 20 MG tablet Take 1 tablet (20 mg) by mouth daily 90 tablet 0    indomethacin (INDOCIN) 50 MG capsule Take 1 capsule (50 mg) by mouth 2 times daily (with meals) 30 capsule 0    lisinopril (ZESTRIL) 20 MG tablet Take 1 tablet (20 mg) by mouth daily 90 tablet 0    metoprolol succinate ER (TOPROL XL) 25 MG 24 hr tablet Take 1 tablet (25 mg) by mouth daily 90 tablet 3    ORDER FOR [facility] [place] Respironics REMSTAR 60 Series Auto CPAP 6cm H2O, Mirage Fx wide nasal mask w/chinstrap               Objective    /79 (BP Location: Right arm, Patient Position: Sitting, Cuff Size: Adult Large)   Pulse 60   Temp 97.5  F (36.4  C) (Oral)   Resp 14   SpO2 99%   There is no height or weight on file to calculate BMI.  Physical Exam  Vitals reviewed.   Constitutional:       Appearance: Normal appearance. He is not ill-appearing or toxic-appearing.   Eyes:      General: No scleral icterus.  Cardiovascular:      Rate and Rhythm: Normal rate and regular rhythm.      Pulses: Normal pulses.      Heart sounds: Normal heart sounds.   Pulmonary:      Effort: Pulmonary effort is normal.      Breath sounds: Normal breath sounds.   Musculoskeletal:      Right lower leg: No edema.      Left lower leg: Edema present.      Comments: Examination of bilateral extremities.  Left lower extremity swollen compared to right.  Patient has dried skin bilaterally in moccasin  pattern with thickened nails suggestive of fungus.  Patient has hyperemia along base of left foot and also MCT joints.  Pain is localized to first MCP joint   Neurological:      Mental Status: He is alert.                    RESIDENT PRELIMINARY INTERPRETATION  IMPRESSION:  No evidence left lower extremity deep venous thrombosis.    Results for orders placed or performed in visit on 10/19/23 (from the past 24 hour(s))   Uric acid   Result Value Ref Range    Uric Acid 6.3 3.4 - 7.0 mg/dL   CBC with platelets differential    Narrative    The following orders were created for panel order CBC with platelets differential.  Procedure                               Abnormality         Status                     ---------                               -----------         ------                     CBC with platelets and d...[[id number]]  Abnormal            Final result                 Please view results for these tests on the individual orders.   CBC with platelets and differential   Result Value Ref Range    WBC Count 6.9 4.0 - 11.0 10e3/uL    RBC Count 4.36 (L) 4.40 - 5.90 10e6/uL    Hemoglobin 12.7 (L) 13.3 - 17.7 g/dL    Hematocrit 38.7 (L) 40.0 - 53.0 %    MCV 89 78 - 100 fL    MCH 29.1 26.5 - 33.0 pg    MCHC 32.8 31.5 - 36.5 g/dL    RDW 13.5 10.0 - 15.0 %    Platelet Count 213 150 - 450 10e3/uL    % Neutrophils 71 %    % Lymphocytes 18 %    % Monocytes 10 %    Mids % (Monos, Eos, Basos)      % Eosinophils 1 %    % Basophils 0 %    % Immature Granulocytes 0 %    NRBCs per 100 WBC 0 <1 /100    Absolute Neutrophils 4.8 1.6 - 8.3 10e3/uL    Absolute Lymphocytes 1.2 0.8 - 5.3 10e3/uL    Absolute Monocytes 0.7 0.0 - 1.3 10e3/uL    Mids Abs (Monos, Eos, Basos)      Absolute Eosinophils 0.1 0.0 - 0.7 10e3/uL    Absolute Basophils 0.0 0.0 - 0.2 10e3/uL    Absolute Immature Granulocytes 0.0 <=0.4 10e3/uL    Absolute NRBCs 0.0 10e3/uL

## 2023-10-19 NOTE — PATIENT INSTRUCTIONS
Ultrasound preliminary read shows no evidence of blood clot/deep vein thrombosis.  Blood counts.  White count is not elevated.  This suggests it is not related to skin infection such as cellulitis.  You have a slight anemia.  This is stable from this spring.  You need to recheck with Dr. Guzman regarding anemia.  Please make sure you are up-to-date on your colon cancer screening.  Uric acid is high normal range.  On first episode of gout uric acid level may not be elevated.    Symptoms most consistent with gout.  Kidney function is stable so I will place you on an anti-inflammatory.  Take Indocin 50 mg 2 times a day with food.  Once pain is resolved take once a day for 2 days then stop.  Wear postop shoe for comfort and avoiding bending toe joint at area of pain.  Ice to area.  Rest.  Elevate foot.    Follow-up with primary for this episode in the next 1 to 2 weeks.    I will send copy of this chart visit to your primary and referring provider.      Component      Latest Ref Rng 10/19/2023  11:51 AM   Uric Acid      3.4 - 7.0 mg/dL 6.3          Component      Latest Ref Rng 7/14/2021  1:08 PM 5/4/2023  7:48 AM 10/19/2023  11:51 AM   WBC      4.0 - 11.0 10e3/uL 5.2   6.9    RBC Count      4.40 - 5.90 10e6/uL 4.71   4.36 (L)    Hemoglobin      13.3 - 17.7 g/dL 13.7  12.7 (L)  12.7 (L)    Hematocrit      40.0 - 53.0 % 41.6   38.7 (L)    MCV      78 - 100 fL 88   89    MCH      26.5 - 33.0 pg 29.1   29.1    MCHC      31.5 - 36.5 g/dL 32.9   32.8    RDW      10.0 - 15.0 % 13.7   13.5    Platelet Count      150 - 450 10e3/uL 203   213    % Neutrophils      %   71    % Lymphocytes      %   18    % Monocytes      %   10    % Eosinophils      %   1    % Basophils      %   0    % Immature Granulocytes      %   0    NRBCs per 100 WBC      <1 /100   0    Absolute Neutrophils      1.6 - 8.3 10e3/uL   4.8    Absolute Lymphocytes      0.8 - 5.3 10e3/uL   1.2    Absolute Monocytes      0.0 - 1.3 10e3/uL   0.7    Absolute  Eosinophils      0.0 - 0.7 10e3/uL   0.1    Absolute Basophils      0.0 - 0.2 10e3/uL   0.0    Absolute Immature Granulocytes      <=0.4 10e3/uL   0.0    Absolute NRBCs      10e3/uL   0.0       Legend:  (L) Low

## 2023-10-19 NOTE — PROGRESS NOTES
"Chief Complaint:     Chief Complaint   Patient presents with    Foot Problems     Left foot; Tuesday morning, patient rolled over in bed, toe got caught. Throughout the day on Tuesday, foot continued hurting and pain worsened by the end of the day. Patient describes pain as \"sharp.\" Pain worsens with walking, improves with rest. Patient has been icing without improvement.         ASSESSMENT     1. Left foot pain    2. Leg edema, left         PLAN    XR of the L foot was negative for any acute fracture per my read.  Discussed with patient the focal left great toe joint pain and overlying warmth and erythema is suspicious for gout, further testing is needed. Uric acid level will be obtained at the [facility] center.  Discussed with patient that exam findings of unilateral left leg 2+ pitting edema is concerning for a DVT at this time.   Patient instructed to go to the [facility] now for left leg ultrasound to evaluate for DVT.  [facility] staff notified and handoff completed with [facility] provider.    Patient verbalized understanding, and agrees with this plan.      HPI: Sergio Davis is an 67 year old male PMHx HTN, hyperlipidemia and CAD who presents today for evaluation of left foot injury.  Onset of the pian was 2 days ago. Specifically reports catching his toe in between frame and sheets with onset of stearns pain. Pain has increased in intensity since but is isolated to the left metatarsophaleangeal joint of the great toe.  Noted increased left ankle swelling around the same time -- reports it is greater than his normal baseline lower leg swelling. Patient has difficulty ambulating secondary to pain in left great toe. Has tried resting, elevating and icing the left foot with minimal benefit. Has not tried pain relievers at home.   No shortness of breath or chest pain.     Patient denies any numbness, tingling, or dysfunction of the left great toe joint.  No history of gout of blood clots.    ROS:      Review of Systems "   Constitutional: Negative.  Negative for chills and fever.   HENT: Negative.  Negative for sore throat.    Respiratory: Negative.  Negative for cough, chest tightness, shortness of breath and wheezing.    Cardiovascular:  Positive for leg swelling. Negative for chest pain and palpitations.   Gastrointestinal: Negative.  Negative for abdominal pain, diarrhea, nausea and vomiting.   Genitourinary:  Negative for dysuria, frequency, hematuria and urgency.   Musculoskeletal:  Positive for arthralgias and joint swelling. Negative for myalgias.   Skin:  Negative for rash.   Allergic/Immunologic: Negative.  Negative for immunocompromised state.   Neurological: Negative.  Negative for headaches.        Problem history  Patient Active Problem List   Diagnosis    S/P complete repair of rotator cuff    Hyperlipidemia LDL goal <70    White coat syndrome with diagnosis of hypertension    BMI 34.0-34.9,adult    Cataract of left eye    Vaccine refused by patient    Tinea cruris    Family history of ischemic heart disease    Abnormal stress test    Status post coronary angiogram    Thoracic aortic aneurysm without rupture (H24)    Coronary artery disease involving native coronary artery of native heart without angina pectoris    FVAIO on CPAP    Class 2 severe obesity due to excess calories with serious comorbidity in adult (H)        Allergies  No Known Allergies     Smoking History  History   Smoking Status    Never   Smokeless Tobacco    Never        Current Meds    Current Outpatient Medications:     atorvastatin (LIPITOR) 20 MG tablet, Take 1 tablet (20 mg) by mouth daily, Disp: 90 tablet, Rfl: 0    lisinopril (ZESTRIL) 20 MG tablet, Take 1 tablet (20 mg) by mouth daily, Disp: 90 tablet, Rfl: 0    metoprolol succinate ER (TOPROL XL) 25 MG 24 hr tablet, Take 1 tablet (25 mg) by mouth daily, Disp: 90 tablet, Rfl: 3    ORDER FOR [facility] [place], Respironics REMSTAR 60 Series Auto CPAP 6cm H2O, Mirage Fx wide nasal mask w/chinstrap, Disp: ,  Rfl:         Vital signs reviewed by Ivan Herrera PA-C  BP (!) 163/83 (BP Location: Left arm, Patient Position: Sitting, Cuff Size: Adult Large)   Pulse 67   Temp 97.7  F (36.5  C) (Tympanic)   Resp 16   Wt 131.1 kg (289 lb)   SpO2 99%   BMI 35.12 kg/m      Physical Exam     Physical Exam  Vitals and nursing note reviewed.   Constitutional:       General: He is not in acute distress.     Appearance: He is well-developed. He is not ill-appearing, toxic-appearing or diaphoretic.   HENT:      Head: Normocephalic and atraumatic.      Right Ear: Hearing, tympanic membrane, ear canal and external ear normal. Tympanic membrane is not perforated, erythematous, retracted or bulging.      Left Ear: Hearing, tympanic membrane, ear canal and external ear normal. Tympanic membrane is not perforated, erythematous, retracted or bulging.      Nose: Nose normal. No mucosal edema, congestion or rhinorrhea.      Mouth/Throat:      Pharynx: No oropharyngeal exudate or posterior oropharyngeal erythema.      Tonsils: No tonsillar exudate or tonsillar abscesses. 0 on the right. 0 on the left.   Eyes:      Conjunctiva/sclera: Conjunctivae normal.   Cardiovascular:      Rate and Rhythm: Normal rate and regular rhythm.      Heart sounds: Normal heart sounds, S1 normal and S2 normal. Heart sounds not distant. No murmur heard.     No friction rub. No gallop.   Pulmonary:      Effort: Pulmonary effort is normal. No respiratory distress.      Breath sounds: Normal breath sounds. No decreased breath sounds, wheezing, rhonchi or rales.   Abdominal:      General: Bowel sounds are normal. There is no distension.      Palpations: Abdomen is soft.      Tenderness: There is no abdominal tenderness.   Musculoskeletal:      Cervical back: Normal range of motion and neck supple.      Right lower leg: Edema present.      Left lower leg: Swelling and tenderness present. No lacerations or bony tenderness. 2+ Pitting Edema present.      Right ankle:  No swelling, ecchymosis or lacerations.      Right Achilles Tendon: No tenderness.      Left ankle: Swelling present. No ecchymosis or lacerations.      Left Achilles Tendon: No tenderness.      Right foot: Normal.      Left foot: Decreased range of motion. Normal capillary refill. Swelling, prominent metatarsal heads and tenderness present. No deformity or laceration.        Feet:       Comments: Left Le+ pitting edema to level of mid calf. No tenderness to palpation.    Left Foot: Warmth and erythema overlying the great toe with tenderness to palpation over MTP joint of the great toe. Decreased ROM secondary to pain.  Sensation grossly intact, capillary refill normal.   Skin:     General: Skin is warm and dry.      Findings: No rash.   Neurological:      Mental Status: He is alert and oriented to person, place, and time.   Psychiatric:         Attention and Perception: He is attentive.         Speech: Speech normal.         Behavior: Behavior normal. Behavior is cooperative.         Thought Content: Thought content normal.         Judgment: Judgment normal.             Ivan Herrera PA-C  10/19/2023, 10:11 AM

## 2023-10-23 ENCOUNTER — TRANSFERRED RECORDS (OUTPATIENT)
Dept: HEALTH INFORMATION MANAGEMENT | Facility: CLINIC | Age: 68
End: 2023-10-23
Payer: COMMERCIAL

## 2023-10-24 DIAGNOSIS — E78.5 HYPERLIPIDEMIA LDL GOAL <70: ICD-10-CM

## 2023-10-24 RX ORDER — ATORVASTATIN CALCIUM 20 MG/1
20 TABLET, FILM COATED ORAL DAILY
Qty: 90 TABLET | Refills: 1 | Status: SHIPPED | OUTPATIENT
Start: 2023-10-24 | End: 2024-04-24

## 2023-11-15 ENCOUNTER — OFFICE VISIT (OUTPATIENT)
Dept: FAMILY MEDICINE | Facility: CLINIC | Age: 68
End: 2023-11-15
Payer: COMMERCIAL

## 2023-11-15 VITALS
HEART RATE: 79 BPM | RESPIRATION RATE: 18 BRPM | DIASTOLIC BLOOD PRESSURE: 62 MMHG | OXYGEN SATURATION: 94 % | SYSTOLIC BLOOD PRESSURE: 104 MMHG
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
## 2023-11-15 DIAGNOSIS — R73.09 ELEVATED GLUCOSE: ICD-10-CM

## 2023-11-15 DIAGNOSIS — M54.2 NECK PAIN: ICD-10-CM

## 2023-11-15 DIAGNOSIS — M10.9 GOUT INVOLVING TOE OF LEFT FOOT, UNSPECIFIED CAUSE, UNSPECIFIED CHRONICITY: ICD-10-CM

## 2023-11-15 DIAGNOSIS — I10 BENIGN ESSENTIAL HYPERTENSION WITH TARGET BLOOD PRESSURE BELOW 140/90: ICD-10-CM

## 2023-11-15 DIAGNOSIS — D64.9 ANEMIA, UNSPECIFIED TYPE: ICD-10-CM

## 2023-11-15 DIAGNOSIS — S12.9XXA: ICD-10-CM

## 2023-11-15 DIAGNOSIS — R55 SYNCOPE, UNSPECIFIED SYNCOPE TYPE: Primary | ICD-10-CM

## 2023-11-15 LAB
HBA1C MFR BLD: 5.8 % (ref 0–5.6)
HGB BLD-MCNC: 12.6 G/DL (ref 13.3–17.7)
URATE SERPL-MCNC: 6.9 MG/DL (ref 3.4–7)

## 2023-11-15 PROCEDURE — 83036 HEMOGLOBIN GLYCOSYLATED A1C: CPT | Performed by: FAMILY MEDICINE

## 2023-11-15 PROCEDURE — 84550 ASSAY OF BLOOD/URIC ACID: CPT | Performed by: FAMILY MEDICINE

## 2023-11-15 PROCEDURE — 85018 HEMOGLOBIN: CPT | Performed by: FAMILY MEDICINE

## 2023-11-15 PROCEDURE — 36415 COLL VENOUS BLD VENIPUNCTURE: CPT | Performed by: FAMILY MEDICINE

## 2023-11-15 PROCEDURE — 99214 OFFICE O/P EST MOD 30 MIN: CPT | Performed by: FAMILY MEDICINE

## 2023-11-15 RX ORDER — METOPROLOL TARTRATE 25 MG/1
12.5 TABLET, FILM COATED ORAL 2 TIMES DAILY
Qty: 90 TABLET | Refills: 3 | Status: SHIPPED | OUTPATIENT
Start: 2023-11-15

## 2023-11-15 RX ORDER — OXYCODONE HYDROCHLORIDE 5 MG/1
5-10 TABLET ORAL
COMMUNITY
Start: 2023-10-26

## 2023-11-15 RX ORDER — RESPIRATORY SYNCYTIAL VIRUS VACCINE 120MCG/0.5
0.5 KIT INTRAMUSCULAR ONCE
Qty: 1 EACH | Refills: 0 | Status: CANCELLED | OUTPATIENT
Start: 2023-11-15 | End: 2023-11-15

## 2023-11-15 RX ORDER — METHOCARBAMOL 1000 MG/1
1000 TABLET, FILM COATED ORAL
COMMUNITY
Start: 2023-10-26 | End: 2023-11-15

## 2023-11-15 RX ORDER — ASPIRIN 81 MG/1
81 TABLET, CHEWABLE ORAL
COMMUNITY
Start: 2023-10-27

## 2023-11-15 RX ORDER — METHOCARBAMOL 500 MG/1
TABLET, FILM COATED ORAL
Qty: 180 TABLET | Refills: 3 | Status: SHIPPED | OUTPATIENT
Start: 2023-11-15

## 2023-11-15 RX ORDER — ALLOPURINOL 100 MG/1
100 TABLET ORAL DAILY
Qty: 90 TABLET | Refills: 3 | Status: SHIPPED | OUTPATIENT
Start: 2023-11-15

## 2023-11-15 ASSESSMENT — PAIN SCALES - GENERAL: PAINLEVEL: MILD PAIN (3)
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
## 2023-11-15 NOTE — PATIENT INSTRUCTIONS
If heart monitor negative, will get neuro referral    Continue allopurinol    Followup labs for anemia, hyperglycemia

## 2023-11-18 PROBLEM — M54.2 NECK PAIN: Status: ACTIVE | Noted: 2023-11-18

## 2023-11-18 PROBLEM — M10.9 GOUT INVOLVING TOE OF LEFT FOOT, UNSPECIFIED CAUSE, UNSPECIFIED CHRONICITY: Status: ACTIVE | Noted: 2023-11-18

## 2023-11-18 PROBLEM — S12.9XXA: Status: ACTIVE | Noted: 2023-11-18

## 2023-11-18 PROBLEM — R73.09 ELEVATED GLUCOSE: Status: ACTIVE | Noted: 2023-11-18
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
## 2023-11-18 NOTE — RESULT ENCOUNTER NOTE
David Hill: Your recent results-  Uric acid normal- continue allopurinol  Good news! Not diabetic, however you are pre-diabetic. Recommend diet and exercise, contact if questions, recheck in 1 year.   Anemia- low hemoglobin, unchanged since first noted in urgent care when you have gout.  Recommend picking up an FIT card from lab, follow the instructions to get a poop sample to see if there is any blood in it.  This will help tell if there is a bleeding problem or not.  Contact if questions     Willard MUNROE

## 2023-12-12 ENCOUNTER — MYC MEDICAL ADVICE (OUTPATIENT)
Dept: FAMILY MEDICINE | Facility: CLINIC | Age: 68
End: 2023-12-12
Payer: COMMERCIAL

## 2023-12-13 NOTE — TELEPHONE ENCOUNTER
I cannot find [facility] results; does he know where they are, or how to get results to me?  Thanks Willard

## 2023-12-15 DIAGNOSIS — S12.9XXA: ICD-10-CM

## 2023-12-15 DIAGNOSIS — S06.0X9D CONCUSSION WITH LOSS OF CONSCIOUSNESS, WITH LOC OF UNSPECIFIED DURATION, SUBSEQUENT ENCOUNTER: ICD-10-CM
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
## 2023-12-15 DIAGNOSIS — R55 SYNCOPE, UNSPECIFIED SYNCOPE TYPE: Primary | ICD-10-CM

## 2023-12-27 ENCOUNTER — OFFICE VISIT (OUTPATIENT)
Dept: NEUROLOGY | Facility: CLINIC | Age: 68
End: 2023-12-27
Attending: FAMILY MEDICINE
Payer: COMMERCIAL

## 2023-12-27 VITALS
HEART RATE: 62 BPM | BODY MASS INDEX: 34.9 KG/M2 | DIASTOLIC BLOOD PRESSURE: 80 MMHG | WEIGHT: 287.2 LBS | SYSTOLIC BLOOD PRESSURE: 147 MMHG

## 2023-12-27 DIAGNOSIS — S12.9XXA: ICD-10-CM
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
## 2023-12-27 DIAGNOSIS — R55 SPELL OF LOSS OF CONSCIOUSNESS: Primary | ICD-10-CM

## 2023-12-27 DIAGNOSIS — S06.0X9D CONCUSSION WITH LOSS OF CONSCIOUSNESS, WITH LOC OF UNSPECIFIED DURATION, SUBSEQUENT ENCOUNTER: ICD-10-CM

## 2023-12-27 DIAGNOSIS — R55 SYNCOPE, UNSPECIFIED SYNCOPE TYPE: ICD-10-CM

## 2023-12-27 PROCEDURE — 99205 OFFICE O/P NEW HI 60 MIN: CPT | Performed by: PSYCHIATRY & NEUROLOGY
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
## 2023-12-27 NOTE — LETTER
December 27, 2023      Sergio Davis  [address] [address]        To Whom It May Concern:    Sergio Davis was seen in our clinic. He may return to work with the following: Should not drive till at least 1/23/24 per [place] state law.        Sincerely,      Odell Bentley
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
## 2023-12-31 DIAGNOSIS — I10 ELEVATED BLOOD PRESSURE READING WITH DIAGNOSIS OF HYPERTENSION: ICD-10-CM

## 2024-01-02 RX ORDER — LISINOPRIL 20 MG/1
20 TABLET ORAL DAILY
Qty: 90 TABLET | Refills: 3 | Status: SHIPPED | OUTPATIENT
Start: 2024-01-02

## 2024-01-12 ENCOUNTER — HOSPITAL ENCOUNTER (OUTPATIENT)
Dept: MRI IMAGING | Facility: HOSPITAL | Age: 69
Discharge: HOME | End: 2024-01-12
Attending: PSYCHIATRY & NEUROLOGY
Payer: COMMERCIAL

## 2024-01-12 DIAGNOSIS — R55 SPELL OF LOSS OF CONSCIOUSNESS: ICD-10-CM

## 2024-01-12 PROCEDURE — A9585 GADOBUTROL INJECTION: HCPCS | Performed by: PSYCHIATRY & NEUROLOGY

## 2024-01-12 PROCEDURE — 70549 MR ANGIOGRAPH NECK W/O&W/DYE: CPT

## 2024-01-12 PROCEDURE — 70544 MR ANGIOGRAPHY HEAD W/O DYE: CPT

## 2024-01-12 PROCEDURE — 255N000002 HC RX 255 OP 636: Performed by: PSYCHIATRY & NEUROLOGY

## 2024-01-12 PROCEDURE — 70553 MRI BRAIN STEM W/O & W/DYE: CPT

## 2024-01-12 RX ORDER — GADOBUTROL 604.72 MG/ML
0.1 INJECTION INTRAVENOUS ONCE
Status: COMPLETED | OUTPATIENT
Start: 2024-01-12 | End: 2024-01-12

## 2024-01-12 RX ADMIN — GADOBUTROL 13 ML: 604.72 INJECTION INTRAVENOUS at 18:50

## 2024-02-08 ENCOUNTER — ANCILLARY PROCEDURE (OUTPATIENT)
Dept: NEUROLOGY | Facility: CLINIC | Age: 69
End: 2024-02-08
Attending: PSYCHIATRY & NEUROLOGY
Payer: COMMERCIAL

## 2024-02-08 DIAGNOSIS — R55 SPELL OF LOSS OF CONSCIOUSNESS: ICD-10-CM

## 2024-02-08 PROCEDURE — 95700 EEG CONT REC W/VID EEG TECH: CPT | Performed by: PSYCHIATRY & NEUROLOGY

## 2024-02-08 PROCEDURE — 95718 EEG PHYS/QHP 2-12 HR W/VEEG: CPT | Performed by: PSYCHIATRY & NEUROLOGY

## 2024-02-08 PROCEDURE — 95713 VEEG 2-12 HR CONT MNTR: CPT | Performed by: PSYCHIATRY & NEUROLOGY

## 2024-02-14 ENCOUNTER — OFFICE VISIT (OUTPATIENT)
Dept: NEUROLOGY | Facility: CLINIC | Age: 69
End: 2024-02-14
Attending: PSYCHIATRY & NEUROLOGY
Payer: COMMERCIAL

## 2024-02-14 VITALS
SYSTOLIC BLOOD PRESSURE: 126 MMHG | BODY MASS INDEX: 35.24 KG/M2 | DIASTOLIC BLOOD PRESSURE: 74 MMHG | HEART RATE: 68 BPM | RESPIRATION RATE: 16 BRPM | WEIGHT: 290 LBS

## 2024-02-14 DIAGNOSIS — S06.0X9D CONCUSSION WITH LOSS OF CONSCIOUSNESS, WITH LOC OF UNSPECIFIED DURATION, SUBSEQUENT ENCOUNTER: ICD-10-CM
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
## 2024-02-14 DIAGNOSIS — R55 SPELL OF LOSS OF CONSCIOUSNESS: Primary | ICD-10-CM

## 2024-02-14 DIAGNOSIS — R55 SYNCOPE, UNSPECIFIED SYNCOPE TYPE: ICD-10-CM

## 2024-02-14 DIAGNOSIS — S12.9XXA: ICD-10-CM

## 2024-02-14 PROCEDURE — 99214 OFFICE O/P EST MOD 30 MIN: CPT | Performed by: PSYCHIATRY & NEUROLOGY
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
## 2024-03-29 ENCOUNTER — PATIENT OUTREACH (OUTPATIENT)
Dept: CARE COORDINATION | Facility: CLINIC | Age: 69
End: 2024-03-29
Payer: COMMERCIAL

## 2024-04-12 ENCOUNTER — PATIENT OUTREACH (OUTPATIENT)
Dept: CARE COORDINATION | Facility: CLINIC | Age: 69
End: 2024-04-12
Payer: COMMERCIAL

## 2024-04-23 DIAGNOSIS — E78.5 HYPERLIPIDEMIA LDL GOAL <70: ICD-10-CM

## 2024-04-24 RX ORDER — ATORVASTATIN CALCIUM 20 MG/1
20 TABLET, FILM COATED ORAL DAILY
Qty: 90 TABLET | Refills: 0 | Status: SHIPPED | OUTPATIENT
Start: 2024-04-24 | End: 2024-07-23

## 2024-06-08 ENCOUNTER — HEALTH MAINTENANCE LETTER (OUTPATIENT)
Age: 69
End: 2024-06-08

## 2024-07-22 DIAGNOSIS — E78.5 HYPERLIPIDEMIA LDL GOAL <70: ICD-10-CM

## 2024-07-23 RX ORDER — ATORVASTATIN CALCIUM 20 MG/1
20 TABLET, FILM COATED ORAL DAILY
Qty: 90 TABLET | Refills: 1 | Status: SHIPPED | OUTPATIENT
Start: 2024-07-23

## 2024-10-25 ENCOUNTER — PATIENT OUTREACH (OUTPATIENT)
Dept: CARE COORDINATION | Facility: CLINIC | Age: 69
End: 2024-10-25
Payer: COMMERCIAL

## 2024-11-01 ENCOUNTER — TELEPHONE (OUTPATIENT)
Dept: FAMILY MEDICINE | Facility: CLINIC | Age: 69
End: 2024-11-01
Payer: COMMERCIAL

## 2024-11-02 DIAGNOSIS — M10.9 GOUT INVOLVING TOE OF LEFT FOOT, UNSPECIFIED CAUSE, UNSPECIFIED CHRONICITY: ICD-10-CM

## 2024-11-03 RX ORDER — ALLOPURINOL 100 MG/1
100 TABLET ORAL DAILY
Qty: 90 TABLET | Refills: 0 | Status: SHIPPED | OUTPATIENT
Start: 2024-11-03

## 2024-11-05 ENCOUNTER — PATIENT OUTREACH (OUTPATIENT)
Dept: CARE COORDINATION | Facility: CLINIC | Age: 69
End: 2024-11-05
Payer: COMMERCIAL

## 2024-11-08 ENCOUNTER — ANCILLARY PROCEDURE (OUTPATIENT)
Dept: GENERAL RADIOLOGY | Facility: CLINIC | Age: 69
End: 2024-11-08
Attending: STUDENT IN AN ORGANIZED HEALTH CARE EDUCATION/TRAINING PROGRAM
Payer: COMMERCIAL

## 2024-11-08 ENCOUNTER — OFFICE VISIT (OUTPATIENT)
Dept: URGENT CARE | Facility: URGENT CARE | Age: 69
End: 2024-11-08
Payer: COMMERCIAL

## 2024-11-08 VITALS
WEIGHT: 283 LBS | TEMPERATURE: 98.3 F | DIASTOLIC BLOOD PRESSURE: 76 MMHG | OXYGEN SATURATION: 96 % | BODY MASS INDEX: 34.39 KG/M2 | HEART RATE: 103 BPM | RESPIRATION RATE: 18 BRPM | SYSTOLIC BLOOD PRESSURE: 159 MMHG

## 2024-11-08 DIAGNOSIS — J18.9 PNEUMONIA OF LEFT LOWER LOBE DUE TO INFECTIOUS ORGANISM: Primary | ICD-10-CM

## 2024-11-08 DIAGNOSIS — R05.1 ACUTE COUGH: ICD-10-CM

## 2024-11-08 PROCEDURE — 99214 OFFICE O/P EST MOD 30 MIN: CPT | Performed by: STUDENT IN AN ORGANIZED HEALTH CARE EDUCATION/TRAINING PROGRAM

## 2024-11-08 PROCEDURE — 71046 X-RAY EXAM CHEST 2 VIEWS: CPT | Mod: TC | Performed by: INTERNAL MEDICINE

## 2024-11-08 RX ORDER — ALBUTEROL SULFATE 90 UG/1
2 INHALANT RESPIRATORY (INHALATION) EVERY 6 HOURS PRN
Qty: 18 G | Refills: 0 | Status: SHIPPED | OUTPATIENT
Start: 2024-11-08

## 2024-11-08 RX ORDER — AZITHROMYCIN 250 MG/1
TABLET, FILM COATED ORAL
Qty: 6 TABLET | Refills: 0 | Status: SHIPPED | OUTPATIENT
Start: 2024-11-08 | End: 2024-11-13

## 2024-11-08 RX ORDER — BENZONATATE 100 MG/1
100 CAPSULE ORAL 3 TIMES DAILY PRN
Qty: 30 CAPSULE | Refills: 0 | Status: SHIPPED | OUTPATIENT
Start: 2024-11-08

## 2024-11-08 ASSESSMENT — PAIN SCALES - GENERAL: PAINLEVEL_OUTOF10: NO PAIN (0)
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
## 2024-11-08 NOTE — PATIENT INSTRUCTIONS
Take Augmentin and Zpack for pneumonia.  For your cough, may use tessalon.  Albuterol inhaler will help cough and shortness of breath.

## 2024-12-09 ENCOUNTER — OFFICE VISIT (OUTPATIENT)
Dept: FAMILY MEDICINE | Facility: CLINIC | Age: 69
End: 2024-12-09
Payer: COMMERCIAL

## 2024-12-09 VITALS
OXYGEN SATURATION: 98 % | WEIGHT: 282 LBS | DIASTOLIC BLOOD PRESSURE: 86 MMHG | SYSTOLIC BLOOD PRESSURE: 164 MMHG | RESPIRATION RATE: 17 BRPM | HEART RATE: 76 BPM | BODY MASS INDEX: 34.34 KG/M2 | HEIGHT: 76 IN | TEMPERATURE: 97.6 F

## 2024-12-09 DIAGNOSIS — Z12.11 SCREEN FOR COLON CANCER: ICD-10-CM

## 2024-12-09 DIAGNOSIS — Z98.1 HISTORY OF FUSION OF CERVICAL SPINE: ICD-10-CM

## 2024-12-09 DIAGNOSIS — I10 BENIGN ESSENTIAL HYPERTENSION WITH TARGET BLOOD PRESSURE BELOW 140/90: ICD-10-CM

## 2024-12-09 DIAGNOSIS — I25.10 CORONARY ARTERY DISEASE INVOLVING NATIVE CORONARY ARTERY OF NATIVE HEART WITHOUT ANGINA PECTORIS: ICD-10-CM

## 2024-12-09 DIAGNOSIS — E66.09 CLASS 1 OBESITY DUE TO EXCESS CALORIES WITH SERIOUS COMORBIDITY AND BODY MASS INDEX (BMI) OF 34.0 TO 34.9 IN ADULT: ICD-10-CM

## 2024-12-09 DIAGNOSIS — Z87.39 HISTORY OF GOUT: ICD-10-CM

## 2024-12-09 DIAGNOSIS — I10 WHITE COAT SYNDROME WITH DIAGNOSIS OF HYPERTENSION: ICD-10-CM

## 2024-12-09 DIAGNOSIS — R73.09 ELEVATED GLUCOSE: ICD-10-CM

## 2024-12-09 DIAGNOSIS — G47.33 OSA ON CPAP: ICD-10-CM

## 2024-12-09 DIAGNOSIS — E66.811 CLASS 1 OBESITY DUE TO EXCESS CALORIES WITH SERIOUS COMORBIDITY AND BODY MASS INDEX (BMI) OF 34.0 TO 34.9 IN ADULT: ICD-10-CM

## 2024-12-09 DIAGNOSIS — E78.5 HYPERLIPIDEMIA LDL GOAL <70: ICD-10-CM

## 2024-12-09 DIAGNOSIS — Z00.01 ENCOUNTER FOR PREVENTATIVE ADULT HEALTH CARE EXAM WITH ABNORMAL FINDINGS: Primary | ICD-10-CM

## 2024-12-09 DIAGNOSIS — Z29.11 NEED FOR VACCINATION AGAINST RESPIRATORY SYNCYTIAL VIRUS: ICD-10-CM

## 2024-12-09 DIAGNOSIS — Z23 NEED FOR TDAP VACCINATION: ICD-10-CM

## 2024-12-09 DIAGNOSIS — Z23 NEED FOR SHINGLES VACCINE: ICD-10-CM

## 2024-12-09 PROBLEM — S12.9XXA: Status: RESOLVED | Noted: 2023-11-18 | Resolved: 2024-12-09
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
## 2024-12-09 LAB
ALBUMIN SERPL BCG-MCNC: 4.6 G/DL (ref 3.5–5.2)
ALP SERPL-CCNC: 60 U/L (ref 40–150)
ALT SERPL W P-5'-P-CCNC: 6 U/L (ref 0–70)
ANION GAP SERPL CALCULATED.3IONS-SCNC: 9 MMOL/L (ref 7–15)
AST SERPL W P-5'-P-CCNC: 26 U/L (ref 0–45)
BILIRUB SERPL-MCNC: 0.5 MG/DL
BUN SERPL-MCNC: 18.9 MG/DL (ref 8–23)
CALCIUM SERPL-MCNC: 9.7 MG/DL (ref 8.8–10.4)
CHLORIDE SERPL-SCNC: 105 MMOL/L (ref 98–107)
CHOLEST SERPL-MCNC: 148 MG/DL
CREAT SERPL-MCNC: 1.12 MG/DL (ref 0.67–1.17)
EGFRCR SERPLBLD CKD-EPI 2021: 71 ML/MIN/1.73M2
FASTING STATUS PATIENT QL REPORTED: YES
FASTING STATUS PATIENT QL REPORTED: YES
GLUCOSE SERPL-MCNC: 95 MG/DL (ref 70–99)
HCO3 SERPL-SCNC: 27 MMOL/L (ref 22–29)
HDLC SERPL-MCNC: 54 MG/DL
LDLC SERPL CALC-MCNC: 80 MG/DL
NONHDLC SERPL-MCNC: 94 MG/DL
POTASSIUM SERPL-SCNC: 4.4 MMOL/L (ref 3.4–5.3)
PROT SERPL-MCNC: 7.7 G/DL (ref 6.4–8.3)
SODIUM SERPL-SCNC: 141 MMOL/L (ref 135–145)
TRIGL SERPL-MCNC: 72 MG/DL
URATE SERPL-MCNC: 6.5 MG/DL (ref 3.4–7)

## 2024-12-09 PROCEDURE — 84550 ASSAY OF BLOOD/URIC ACID: CPT | Performed by: FAMILY MEDICINE

## 2024-12-09 PROCEDURE — 80061 LIPID PANEL: CPT | Performed by: FAMILY MEDICINE

## 2024-12-09 PROCEDURE — G0009 ADMIN PNEUMOCOCCAL VACCINE: HCPCS | Performed by: FAMILY MEDICINE

## 2024-12-09 PROCEDURE — G0439 PPPS, SUBSEQ VISIT: HCPCS | Performed by: FAMILY MEDICINE

## 2024-12-09 PROCEDURE — 99214 OFFICE O/P EST MOD 30 MIN: CPT | Mod: 25 | Performed by: FAMILY MEDICINE

## 2024-12-09 PROCEDURE — 36415 COLL VENOUS BLD VENIPUNCTURE: CPT | Performed by: FAMILY MEDICINE

## 2024-12-09 PROCEDURE — 80053 COMPREHEN METABOLIC PANEL: CPT | Performed by: FAMILY MEDICINE

## 2024-12-09 PROCEDURE — 90677 PCV20 VACCINE IM: CPT | Performed by: FAMILY MEDICINE

## 2024-12-09 SDOH — HEALTH STABILITY: PHYSICAL HEALTH: ON AVERAGE, HOW MANY DAYS PER WEEK DO YOU ENGAGE IN MODERATE TO STRENUOUS EXERCISE (LIKE A BRISK WALK)?: 3 DAYS

## 2024-12-09 ASSESSMENT — SOCIAL DETERMINANTS OF HEALTH (SDOH): HOW OFTEN DO YOU GET TOGETHER WITH FRIENDS OR RELATIVES?: MORE THAN THREE TIMES A WEEK

## 2024-12-09 ASSESSMENT — PAIN SCALES - GENERAL: PAINLEVEL_OUTOF10: NO PAIN (0)

## 2024-12-09 NOTE — PATIENT INSTRUCTIONS
Substitute healthy choices for bread rice pasta and potatoes, snacks    Try to lose 12# next year    Get tetanus at pharmacy    MyChart blood pressures/pulses in morning before any medication, then at least 2 hrs or more later x 3 days

## 2024-12-09 NOTE — PROGRESS NOTES
Preventive Care Visit  [facility]  Willard Guzman MD, Family Medicine  Dec 9, 2024  {Provider  Link to [facility] :198150}    {PROVIDER CHARTING PREFERENCE:503844}    Brenda Hill is a 69 year old, presenting for the following:  Physical (AWV/)        12/9/2024     9:15 AM   Additional Questions   Roomed by Rasheed ADHIKARI     HPI    Wellness Visit Notes:  - Colon cancer screening last done via FIT TEST on 5/2023 (impression: negative). Colon cancer screening due now. Pt would like to complete.    - PSA lab work last performed 5/2023 (value of 0.48 micrograms/L). Discussed risks/benefits of PSA testing today in detail, including possibility of false positive results and/or possibility of biopsy recommended as next step. Pt would like to complete today.     - Immunizations: Pt is due for vaccines but declines at this time.   - ACP: Pt declines discussion today.    ***  {MA/LPN/RN Pre-Provider Visit Orders- hCG/UA/Strep (Optional):638637}  {SUPERLIST (Optional):207854}  {additonal problems for provider to add (Optional):085668}  Health Care Directive  Patient does not have a Health Care Directive: Discussed advance care planning with patient; however, patient declined at this time.      12/9/2024   General Health   How would you rate your overall physical health? (!) FAIR   Feel stress (tense, anxious, or unable to sleep) Not at all            12/9/2024   Nutrition   Diet: I don't know            12/9/2024   Exercise   Days per week of moderate/strenous exercise 3 days            12/9/2024   Social Factors   Frequency of gathering with friends or relatives More than three times a week   Worry food won't last until get money to buy more No   Food not last or not have enough money for food? No   Do you have housing? (Housing is defined as stable permanent housing and does not include staying ouside in a car, in a tent, in an abandoned building, in an overnight shelter, or  "couch-surfing.) Yes   Are you worried about losing your housing? No   Lack of transportation? No   Unable to get utilities (heat,electricity)? No            12/9/2024   Fall Risk   Fallen 2 or more times in the past year? No     No    Trouble with walking or balance? No     No        Patient-reported    Multiple values from one day are sorted in reverse-chronological order          12/9/2024   Activities of Daily Living- Home Safety   Needs help with the following daily activites None of the above   Safety concerns in the home None of the above            12/9/2024   Dental   Dentist two times every year? (!) NO            12/9/2024   Hearing Screening   Hearing concerns? None of the above            12/9/2024   Driving Risk Screening   Patient/family members have concerns about driving No            12/9/2024   General Alertness/Fatigue Screening   Have you been more tired than usual lately? No            12/9/2024   Urinary Incontinence Screening   Bothered by leaking urine in past 6 months No            12/9/2024   TB Screening   Were you born outside of the [place]? No            Today's PHQ-2 Score:       12/9/2024     9:06 AM   PHQ-2 ( 1999 Pf[employer])   Q1: Little interest or pleasure in doing things 0    Q2: Feeling down, depressed or hopeless 0    PHQ-2 Score 0    Q1: Little interest or pleasure in doing things Not at all   Q2: Feeling down, depressed or hopeless Not at all   PHQ-2 Score 0       Patient-reported           12/9/2024   Substance Use   Alcohol more than 3/day or more than 7/wk No   Do you have a current opioid prescription? No   How severe/bad is pain from 1 to 10? 0/10 (No Pain)   Do you use any other substances recreationally? No        Social History     Tobacco Use    Smoking status: Never     Passive exposure: Never    Smokeless tobacco: Never    Tobacco comments:     no second hand smoke   Vaping Use    Vaping status: Never Used   Substance Use Topics    Alcohol use: Yes     Comment: \"very " "little\"    Drug use: No     {Provider  If there are gaps in the social history shown above, please follow the link to update and then refresh the note Link to Social and Substance History :842034}      12/9/2024   AAA Screening   Family history of Abdominal Aortic Aneurysm (AAA)? Unsure      ASCVD Risk   The ASCVD Risk score (Annamarie YANG, et al., 2019) failed to calculate for the following reasons:    The valid total cholesterol range is 130 to 320 mg/dL    {Link to Fracture Risk Assessment Tool (Optional):534763}    {Provider  REQUIRED FOR AWV Use the storyboard to review patient history, after sections have been marked as reviewed, refresh note to capture documentation:572937}  {Provider   REQUIRED AWV use this link to review and update sexual activity history  after section has been marked as reviewed, refresh note to capture documentation:302877}  Reviewed and updated as needed this visit by Provider                    {HISTORY OPTIONS (Optional):218050}  Current providers sharing in care for this patient include:  Patient Care Team:  Willard Guzman MD as PCP - General (Family Medicine)  Willard Guzman MD as Assigned PCP  Odell Bentley MD as MD (Neurology)  Odell Bentley MD as Assigned Neuroscience Provider    The following health maintenance items are reviewed in Epic and correct as of today:  Health Maintenance   Topic Date Due    DTAP/TDAP/TD IMMUNIZATION (1 - Tdap) Never done    ZOSTER IMMUNIZATION (1 of 2) Never done    RSV VACCINE (1 - Risk 60-74 years 1-dose series) Never done    Pneumococcal Vaccine: 65+ Years (1 of 1 - PCV) Never done    MEDICARE ANNUAL WELLNESS VISIT  04/28/2024    BMP  05/04/2024    LIPID  05/04/2024    COLORECTAL CANCER SCREENING  05/04/2024    INFLUENZA VACCINE (1) Never done    COVID-19 Vaccine (1 - 2024-25 season) Never done    ANNUAL REVIEW OF HM ORDERS  11/15/2024    FALL RISK ASSESSMENT  12/09/2025    GLUCOSE  05/04/2026    ADVANCE CARE PLANNING  " "04/29/2028    HEPATITIS C SCREENING  Completed    PHQ-2 (once per calendar year)  Completed    HPV IMMUNIZATION  Aged Out    MENINGITIS IMMUNIZATION  Aged Out    RSV MONOCLONAL ANTIBODY  Aged Out       {ROS Picklists (Optional):437011}     Objective    Exam  BP (!) 152/82   Pulse 58   Temp 97.6  F (36.4  C) (Temporal)   Resp 17   Ht 1.93 m (6' 4\")   Wt 127.9 kg (282 lb)   SpO2 98%   BMI 34.33 kg/m     Estimated body mass index is 34.33 kg/m  as calculated from the following:    Height as of this encounter: 1.93 m (6' 4\").    Weight as of this encounter: 127.9 kg (282 lb).    Physical Exam  {Exam Choices (Optional):934656}         12/9/2024   Mini Cog   Clock Draw Score 2 Normal   3 Item Recall 2 objects recalled   Mini Cog Total Score 4        {A Mini-Cog total score of 0-2 suggests the possibility of dementia, score of 3-5 suggests no dementia:922229}         Signed Electronically by: Willard Guzman MD  {Email feedback regarding this note to [email or web address]   :608799}  " "Ht 1.93 m (6' 4\")   Wt 127.9 kg (282 lb)   SpO2 98%   BMI 34.33 kg/m     Estimated body mass index is 34.33 kg/m  as calculated from the following:    Height as of this encounter: 1.93 m (6' 4\").    Weight as of this encounter: 127.9 kg (282 lb).    Physical Exam           12/9/2024   Mini Cog   Clock Draw Score 2 Normal   3 Item Recall 2 objects recalled   Mini Cog Total Score 4                 Signed Electronically by: Willard Guzman MD    "

## 2024-12-12 NOTE — RESULT ENCOUNTER NOTE
David Hill, your recent results are normal so far. Please contact if any questions.   Willard Guzman MD

## 2024-12-15 PROBLEM — E66.812 CLASS 2 SEVERE OBESITY DUE TO EXCESS CALORIES WITH SERIOUS COMORBIDITY IN ADULT (H): Status: RESOLVED | Noted: 2023-10-19 | Resolved: 2024-12-15

## 2024-12-15 PROBLEM — E66.01 CLASS 2 SEVERE OBESITY DUE TO EXCESS CALORIES WITH SERIOUS COMORBIDITY IN ADULT (H): Status: RESOLVED | Noted: 2023-10-19 | Resolved: 2024-12-15

## 2025-01-05 DIAGNOSIS — I10 ELEVATED BLOOD PRESSURE READING WITH DIAGNOSIS OF HYPERTENSION: ICD-10-CM

## 2025-01-06 RX ORDER — LISINOPRIL 20 MG/1
20 TABLET ORAL DAILY
Qty: 90 TABLET | Refills: 3 | Status: SHIPPED | OUTPATIENT
Start: 2025-01-06

## 2025-01-19 DIAGNOSIS — E78.5 HYPERLIPIDEMIA LDL GOAL <70: ICD-10-CM

## 2025-01-19 RX ORDER — ATORVASTATIN CALCIUM 20 MG/1
20 TABLET, FILM COATED ORAL DAILY
Qty: 90 TABLET | Refills: 0 | Status: SHIPPED | OUTPATIENT
Start: 2025-01-19

## 2025-01-22 DIAGNOSIS — I10 BENIGN ESSENTIAL HYPERTENSION WITH TARGET BLOOD PRESSURE BELOW 140/90: ICD-10-CM

## 2025-01-22 RX ORDER — METOPROLOL TARTRATE 25 MG/1
12.5 TABLET, FILM COATED ORAL 2 TIMES DAILY
Qty: 90 TABLET | Refills: 0 | OUTPATIENT
Start: 2025-01-22

## 2025-01-24 ENCOUNTER — MYC REFILL (OUTPATIENT)
Dept: FAMILY MEDICINE | Facility: CLINIC | Age: 70
End: 2025-01-24
Payer: COMMERCIAL

## 2025-01-24 DIAGNOSIS — I10 BENIGN ESSENTIAL HYPERTENSION WITH TARGET BLOOD PRESSURE BELOW 140/90: ICD-10-CM

## 2025-01-24 DIAGNOSIS — E78.5 HYPERLIPIDEMIA LDL GOAL <70: ICD-10-CM

## 2025-01-24 DIAGNOSIS — M10.9 GOUT INVOLVING TOE OF LEFT FOOT, UNSPECIFIED CAUSE, UNSPECIFIED CHRONICITY: ICD-10-CM

## 2025-01-24 RX ORDER — ATORVASTATIN CALCIUM 20 MG/1
20 TABLET, FILM COATED ORAL DAILY
Qty: 90 TABLET | Refills: 0 | OUTPATIENT
Start: 2025-01-24

## 2025-01-26 DIAGNOSIS — M10.9 GOUT INVOLVING TOE OF LEFT FOOT, UNSPECIFIED CAUSE, UNSPECIFIED CHRONICITY: ICD-10-CM

## 2025-01-26 DIAGNOSIS — I10 BENIGN ESSENTIAL HYPERTENSION WITH TARGET BLOOD PRESSURE BELOW 140/90: ICD-10-CM

## 2025-01-27 RX ORDER — METOPROLOL TARTRATE 25 MG/1
12.5 TABLET, FILM COATED ORAL 2 TIMES DAILY
Qty: 90 TABLET | Refills: 3 | Status: SHIPPED | OUTPATIENT
Start: 2025-01-27

## 2025-01-27 RX ORDER — ALLOPURINOL 100 MG/1
100 TABLET ORAL DAILY
Qty: 90 TABLET | Refills: 3 | Status: SHIPPED | OUTPATIENT
Start: 2025-01-27

## 2025-04-22 DIAGNOSIS — E78.5 HYPERLIPIDEMIA LDL GOAL <70: ICD-10-CM

## 2025-04-22 RX ORDER — ATORVASTATIN CALCIUM 20 MG/1
20 TABLET, FILM COATED ORAL DAILY
Qty: 90 TABLET | Refills: 1 | Status: SHIPPED | OUTPATIENT
Start: 2025-04-22

## (undated) DEVICE — GW VASC .035IN DIA 260CML 7CML 3 MM RADIUS J CURVE 502455

## (undated) DEVICE — SLEEVE TR BAND RADIAL COMPRESSION DEVICE 29CM XX-RF06L

## (undated) DEVICE — VALVE HEMOSTASIS .115" DUOSTAT ADAPTER 23245

## (undated) DEVICE — SHTH INTRO 0.021IN ID 6FR DIA

## (undated) DEVICE — CATH GUIDING IKARI 6FR IL3.5 LEFT HEARTRAIL 40-6370

## (undated) DEVICE — LEFT HEART PK FAIRVIEW UNIV MEDICAL CENTER

## (undated) DEVICE — KIT HAND CONTROL ACIST 014644 AR-P54

## (undated) DEVICE — MANIFOLD KIT ANGIO AUTOMATED 014613

## (undated) DEVICE — GUIDEWIRE OPTOWIRE 3 W/O GAUGE FACTOR CONNECTOR F1032

## (undated) DEVICE — STATLOCK PSI DEVICE

## (undated) DEVICE — TUBING PRESSURE 30"

## (undated) DEVICE — CATH JACKY 5FR 3.5 CURVE 40-5023

## (undated) RX ORDER — SODIUM CHLORIDE 9 MG/ML
INJECTION, SOLUTION INTRAVENOUS
Status: DISPENSED
Start: 2021-07-14

## (undated) RX ORDER — HEPARIN SODIUM 1000 [USP'U]/ML
INJECTION, SOLUTION INTRAVENOUS; SUBCUTANEOUS
Status: DISPENSED
Start: 2021-07-14

## (undated) RX ORDER — FENTANYL CITRATE 50 UG/ML
INJECTION, SOLUTION INTRAMUSCULAR; INTRAVENOUS
Status: DISPENSED
Start: 2021-07-14

## (undated) RX ORDER — NITROGLYCERIN 5 MG/ML
VIAL (ML) INTRAVENOUS
Status: DISPENSED
Start: 2021-07-14